# Patient Record
Sex: FEMALE | Race: WHITE | NOT HISPANIC OR LATINO | URBAN - METROPOLITAN AREA
[De-identification: names, ages, dates, MRNs, and addresses within clinical notes are randomized per-mention and may not be internally consistent; named-entity substitution may affect disease eponyms.]

---

## 2023-05-17 ENCOUNTER — INPATIENT (INPATIENT)
Facility: HOSPITAL | Age: 83
LOS: 4 days | Discharge: HOME | DRG: 603 | End: 2023-05-22
Attending: SURGERY | Admitting: SURGERY
Payer: MEDICARE

## 2023-05-17 VITALS
RESPIRATION RATE: 18 BRPM | TEMPERATURE: 99 F | HEART RATE: 61 BPM | OXYGEN SATURATION: 97 % | HEIGHT: 64 IN | WEIGHT: 188.05 LBS | SYSTOLIC BLOOD PRESSURE: 159 MMHG | DIASTOLIC BLOOD PRESSURE: 81 MMHG

## 2023-05-17 DIAGNOSIS — Z98.890 OTHER SPECIFIED POSTPROCEDURAL STATES: Chronic | ICD-10-CM

## 2023-05-17 LAB
ANION GAP SERPL CALC-SCNC: 10 MMOL/L — SIGNIFICANT CHANGE UP (ref 5–17)
APTT BLD: 35.2 SEC — SIGNIFICANT CHANGE UP (ref 27.5–35.5)
BLD GP AB SCN SERPL QL: NEGATIVE — SIGNIFICANT CHANGE UP
BUN SERPL-MCNC: 17 MG/DL — SIGNIFICANT CHANGE UP (ref 7–23)
CALCIUM SERPL-MCNC: 9.4 MG/DL — SIGNIFICANT CHANGE UP (ref 8.4–10.5)
CHLORIDE SERPL-SCNC: 104 MMOL/L — SIGNIFICANT CHANGE UP (ref 96–108)
CO2 SERPL-SCNC: 24 MMOL/L — SIGNIFICANT CHANGE UP (ref 22–31)
CREAT SERPL-MCNC: 0.88 MG/DL — SIGNIFICANT CHANGE UP (ref 0.5–1.3)
CRP SERPL-MCNC: <3 MG/L — SIGNIFICANT CHANGE UP (ref 0–4)
EGFR: 66 ML/MIN/1.73M2 — SIGNIFICANT CHANGE UP
ERYTHROCYTE [SEDIMENTATION RATE] IN BLOOD: 4 MM/HR — SIGNIFICANT CHANGE UP
GLUCOSE BLDC GLUCOMTR-MCNC: 295 MG/DL — HIGH (ref 70–99)
GLUCOSE SERPL-MCNC: 98 MG/DL — SIGNIFICANT CHANGE UP (ref 70–99)
HCT VFR BLD CALC: 43.4 % — SIGNIFICANT CHANGE UP (ref 34.5–45)
HGB BLD-MCNC: 14.3 G/DL — SIGNIFICANT CHANGE UP (ref 11.5–15.5)
INR BLD: 1.01 — SIGNIFICANT CHANGE UP (ref 0.88–1.16)
MCHC RBC-ENTMCNC: 31.2 PG — SIGNIFICANT CHANGE UP (ref 27–34)
MCHC RBC-ENTMCNC: 32.9 GM/DL — SIGNIFICANT CHANGE UP (ref 32–36)
MCV RBC AUTO: 94.6 FL — SIGNIFICANT CHANGE UP (ref 80–100)
NRBC # BLD: 0 /100 WBCS — SIGNIFICANT CHANGE UP (ref 0–0)
PLATELET # BLD AUTO: 296 K/UL — SIGNIFICANT CHANGE UP (ref 150–400)
POTASSIUM SERPL-MCNC: 4.1 MMOL/L — SIGNIFICANT CHANGE UP (ref 3.5–5.3)
POTASSIUM SERPL-SCNC: 4.1 MMOL/L — SIGNIFICANT CHANGE UP (ref 3.5–5.3)
PROTHROM AB SERPL-ACNC: 12 SEC — SIGNIFICANT CHANGE UP (ref 10.5–13.4)
RBC # BLD: 4.59 M/UL — SIGNIFICANT CHANGE UP (ref 3.8–5.2)
RBC # FLD: 13.9 % — SIGNIFICANT CHANGE UP (ref 10.3–14.5)
RH IG SCN BLD-IMP: POSITIVE — SIGNIFICANT CHANGE UP
SODIUM SERPL-SCNC: 138 MMOL/L — SIGNIFICANT CHANGE UP (ref 135–145)
WBC # BLD: 7.63 K/UL — SIGNIFICANT CHANGE UP (ref 3.8–10.5)
WBC # FLD AUTO: 7.63 K/UL — SIGNIFICANT CHANGE UP (ref 3.8–10.5)

## 2023-05-17 PROCEDURE — 99285 EMERGENCY DEPT VISIT HI MDM: CPT

## 2023-05-17 PROCEDURE — 73630 X-RAY EXAM OF FOOT: CPT | Mod: 26,RT

## 2023-05-17 RX ORDER — METOPROLOL TARTRATE 50 MG
25 TABLET ORAL
Refills: 0 | Status: DISCONTINUED | OUTPATIENT
Start: 2023-05-17 | End: 2023-05-22

## 2023-05-17 RX ORDER — HEPARIN SODIUM 5000 [USP'U]/ML
5000 INJECTION INTRAVENOUS; SUBCUTANEOUS EVERY 8 HOURS
Refills: 0 | Status: DISCONTINUED | OUTPATIENT
Start: 2023-05-17 | End: 2023-05-22

## 2023-05-17 RX ORDER — VANCOMYCIN HCL 1 G
1000 VIAL (EA) INTRAVENOUS ONCE
Refills: 0 | Status: COMPLETED | OUTPATIENT
Start: 2023-05-17 | End: 2023-05-17

## 2023-05-17 RX ADMIN — HEPARIN SODIUM 5000 UNIT(S): 5000 INJECTION INTRAVENOUS; SUBCUTANEOUS at 22:32

## 2023-05-17 RX ADMIN — Medication 250 MILLIGRAM(S): at 22:32

## 2023-05-17 RX ADMIN — Medication 25 MILLIGRAM(S): at 22:32

## 2023-05-17 NOTE — ED PROVIDER NOTE - WET READ LAUNCH FT
-- DO NOT REPLY / DO NOT REPLY ALL --  -- Message is from Engagement Center Operations (ECO) --    ONLY TO BE USED WITHIN A REFILL MEDICATION ENCOUNTER    Med Refill  Is the patient currently having any symptoms?: No/Non-Emergent symptoms    Name of medication requested: See pended med    Has patient contacted the pharmacy? Yes    Is this the first request for the medication in the last 48 hours?: Yes      Patient is requesting a medication refill - medication is on active list ([place] so she's requesting a 90 supply or the longest that can be approved      Full name of the provider who ordered the medication: [facility] site name / Account # for provider:Diaz    Preferred Pharmacy: Pharmacy  [id number] [address] [facility] [facility]    Patient confirmed the above pharmacy as correct?  Yes      Caller Information       Type Contact Phone/Fax    10/28/2022 11:18 AM CDT Phone (Incoming) KYLIE SMITH (Emergency Contact) [phone]          Alternative phone number:     Can a detailed message be left?: Yes    Patient is completely out of medication: Verify if patient is currently experiencing symptoms. If patient is symptomatic, proceed with front end triage instead of medication refill. If patient is not symptomatic but is completely out of medication, cole as High priority when routing. Inform patient: “Please call back with any questions or concerns and if your condition becomes life threatening, you should seek immediate medical assistance by calling 911 or going to the Emergency Department for evaluation.”    Inform all patients: \"If the clinical team needs to contact you regarding this refill, please be aware the return phone call may come from an unidentified or out of state phone number and your refill request will be addressed as soon as the clinical team reviews your message.\"  
Medication name: SITagliptin Phosphate 100 MG Oral Tablet (Januvia)  Last Refill Details: Date 9/8/2020, # of tablets: 90, # of refills approved:1   Ordering provider name: Yrn Lundy MD  Last office visit with ordering provider: 10/5/2022    unable to refill medication per established guidelines request forwarded to provider to review, please call patient to discuss refills    Patient is requesting a medication refill - medication is on active list (Patient will be out of the country for 6 months so she's requesting a 90 supply or the longest that can be approved      They are also requesting a call back with update once approved  KYLIE SMITH (Emergency Contact) [phone]         Preferred Pharmacy: Pharmacy  [id number] - [facility] [address] [facility]     
Refill approved as requested.  
There are no Wet Read(s) to document.

## 2023-05-17 NOTE — H&P ADULT - ASSESSMENT
81 yo f presented with concern of right foot infection after a fall that resulted in an injury to her right foot. Failed PO antibiotics     Plan:  - Admit to Dr. Gabriel   - q24 vanc  - pending x-ray read  - blood cultures drawn, pending results  - Home meds  - Discussed with Chief on call    [id number]

## 2023-05-17 NOTE — ED PROVIDER NOTE - PHYSICAL EXAMINATION
CONSTITUTIONAL: Non-toxic; in no apparent distress  HEAD: Normocephalic; atraumatic  EYES: No icterus or injection  ENMT: External appears normal  NECK: Supple; normal ROM, no visible mass, symmetric, trachea midline  CARD: Extremities warm and well perfused, normal HR, normal BP  RESP: No resp distress, symmetric chest rise  ABD: No obvious ascites or abd distension  EXT: Grossly normal ROM in all four extremities, + shallow ulcer on R MTP w surrounding erythema and mild swelling, 2+ BL PT and DP pulses, cap refill <2s, wwp  SKIN: Warm, dry, no rash  NEURO: Gait normal, grossly symmetric face, strength intact throughout extremities.

## 2023-05-17 NOTE — ED PROVIDER NOTE - OBJECTIVE STATEMENT
81 yo F w PMH of bunions, recent ulceration of R 1st MTP since March, p/w redness and swelling surrounding ulcer. Pt had fall in March during which she wounded her foot, causing the initial ulceration. Since that time she has visited a podiatrist, completed a course of PO abx, and started using a topical debriding agent on the ulcer. There has been Increasing redness and pain in her R MTP, prompting her podiatrist to send her into the ED for concern for osteomyelitis. She has had recent chills, however no measured fever. No chest pain, shortness of breath, numbness, tingling.

## 2023-05-17 NOTE — H&P ADULT - NSHPLABSRESULTS_GEN_ALL_CORE
14.3   7.63  )-----------( 296      ( 17 May 2023 20:51 )             43.4   05-17    138  |  104  |  17  ----------------------------<  98  4.1   |  24  |  0.88    Ca    9.4      17 May 2023 20:51      pending radiology read of xray

## 2023-05-17 NOTE — ED ADULT NURSE NOTE - OBJECTIVE STATEMENT
82y F, PMHx HTN, asthma and ADHD, presents to the ED c/o rt foot pain, swelling since a fall in March 2023. Pt states she was sent in by MD for R foot to r/o infection. Minimal drainage noted to rt foot. Denies chest pain, fever, numbness, tingling.

## 2023-05-17 NOTE — ED ADULT NURSE NOTE - NSFALLRISKINTERV_ED_ALL_ED

## 2023-05-17 NOTE — ED PROVIDER NOTE - NS ED ROS FT
CONSTITUTIONAL: No fever, no chills, no fatigue  EYES: No eye redness, no visual changes  ENT: No ear pain, no sore throat  CARDIOVASCULAR: No chest pain, no palpitations  RESPIRATORY: No cough, no SOB  GI: No abdominal pain, no nausea, no vomiting, no constipation, no diarrhea  GENITOURINARY: No dysuria, no frequency, no hematuria  MUSCULOSKELETAL: No back pain, no joint pain, no myalgias  SKIN: No rash, no peripheral edema, + foot ulcer  NEURO: No headache, no confusion    ALL OTHER SYSTEMS NEGATIVE.

## 2023-05-17 NOTE — ED PROVIDER NOTE - CLINICAL SUMMARY MEDICAL DECISION MAKING FREE TEXT BOX
R foot MTP ulceration, r/o OM. No s/s of systemic infection. Will consult pod and vasc.  + BCx, +/- Abx. Basic labs, XR foot.  For admission.

## 2023-05-17 NOTE — CONSULT NOTE ADULT - SUBJECTIVE AND OBJECTIVE BOX
Attending: Dr. Laws     Patient is a 82y old  Female who presents with a chief complaint of Ulcerated Bunion (17 May 2023 21:21)      HPI:  Ms. Patel is a 81 yo F, reported pmh HTN, asthma and ADHD, presenting with concern for right foot infection. States that she has had bilateral bunions for the past several years but she sustained a fall on March 11th where she admits to LOC and injury to her right foot at the tip of the bunion. Since then it has been red and inflamed and painful, she reports that she can ambulate with a cane but it is painful and it is painful to drive. She reports that she had tried topical neosporin that only made the wound worse and then was seen by her podiatrist who prescribed her an oral antibiotic that she cannot recall for presumed treatment of cellulitis. She comes in today to see Dr. Gabriel on the recommendation of her niece who has had correspondence with Dr. Gabriel. Of note, [place], sees Dr. Elaine for podaitry. She denies any vascular history although reports that her right foot has always felt colder than her left.  Podiatry Addendum: 81 yo F, reported pmh HTN, asthma and ADHD, presenting with concern for right foot infection. Pod consulted to evaluate foot. Pt states she fell 1 month ago and developed wounds to b/l feet. States wounds had not been present prior. Developed a pain and a shooting type pain to R foot. Has seen podiatrist Dr. Elaine in [place] for treatment. Recieved oral antibiotics for presumed cellulitis, but did not resolve infection. Wounds also failing to heal. States she generally does have slow healing wounds. Complains of coldness to her feet. Denying purulent discharge/malodor.     Review of systems negative except per HPI and as stated below  General:	 no weakness; no fevers, no chills  Skin/Breast: no rash  Respiratory and Thorax: no SOB, no cough  Cardiovascular:	No chest pain  Gastrointestinal:	 no nausea, vomiting , diarrhea  Genitourinary:	no dysuria, no difficulty urinating, no hematuria  Musculoskeletal:	no weakness, no joint swelling/pain  Neurological:	no focal weakness/numbness  Endocrine:	no polyuria, no polydipsia    PAST MEDICAL & SURGICAL HISTORY:  HTN (hypertension)      Asthma      ADHD      History of conization of cervix        Home Medications:  metoprolol tartrate 25 mg oral tablet: 1 orally 2 times a day (17 May 2023 21:26)    Allergies    No Known Drug Allergies  unknown pain medication allergy (Other)    Intolerances      FAMILY HISTORY:    Social History:       LABS                        14.3   7.63  )-----------( 296      ( 17 May 2023 20:51 )             43.4     05-17    138  |  104  |  17  ----------------------------<  98  4.1   |  24  |  0.88    Ca    9.4      17 May 2023 20:51      PT/INR - ( 17 May 2023 20:51 )   PT: 12.0 sec;   INR: 1.01          PTT - ( 17 May 2023 20:51 )  PTT:35.2 sec  ESR: 4  CRP: --  05-17 @ 20:51    Vital Signs Last 24 Hrs  T(C): 36.1 (17 May 2023 22:11), Max: 37.2 (17 May 2023 19:45)  T(F): 97 (17 May 2023 22:11), Max: 99 (17 May 2023 19:45)  HR: 61 (17 May 2023 19:45) (61 - 61)  BP: 159/81 (17 May 2023 19:45) (159/81 - 159/81)  BP(mean): --  RR: 18 (17 May 2023 19:45) (18 - 18)  SpO2: 97% (17 May 2023 19:45) (97% - 97%)    Parameters below as of 17 May 2023 19:45  Patient On (Oxygen Delivery Method): room air        PHYSICAL EXAM  General: NAD, AA0x3    Lower Extremity Focused:  Vasc: DP/PT 1/4 b/l, Erythema of R foot extending to midfoot, mild erythema on L medial eminence 1st MPJ, no edema, Temp gradient cool to cold b/l  Derm: Superficial granular wound on R medial MPJ 1 cm x 0.6 cm 0.1 cm with no drainage/tracking or tunneling; hyperkeratotic lesion on R dorsal and medial 3rd digit DIPJ; hyperkeratotic lesion dorsal 2nd DIPJ; superficial wound on lateral 5th met tuberosity with granular base; hyperkeratotic border and surrounding erythema; scab over wound on L lateral malleolus with no drainage; no other open wounds;  Neuro: Protective sensation diminished  MSK: B/l bunion deformity,     RADIOLOGY  XR R foot wet read: no evidence of gas, bunion deformity

## 2023-05-17 NOTE — H&P ADULT - NSHPREVIEWOFSYSTEMS_GEN_ALL_CORE
Head: denies headaches, dizziness & lightheadedness  Eyes: denies changes in vision, eye pain, double vision & eye discharge  Ears: denies changes in hearing & ear discharge  Nose: denies rhinorrhea  Mouth: denies bleeding gums & sore tongue & sore throat  Neck: denies swollen lymph nodes   Respiratory: denies SOB, cough, sputum production, wheezing  Cardiac: denies CP & irregular heart beat  Abdominal: denies abdominal pain, + in bowel movements  : denies dysuria, frequent urination, hematuria  Musculoskeletal: b/l foot pain, r foot pain with ambulation and driving   Neuro: denies involuntary muscle movements  Psych: no depression, no anxiety

## 2023-05-17 NOTE — CONSULT NOTE ADULT - SUBJECTIVE AND OBJECTIVE BOX
Vascular Attending: NIYAH Gabriel MD    HPI:  Ms. Patel is a 81 yo F, reported pmh HTN, asthma and ADHD, presenting with concern for right foot infection. States that she has had bilateral bunions for the past several years but she sustained a fall on March 11th where she admits to LOC and injury to her right foot at the tip of the bunion. Since then it has been red and inflamed and painful, she reports that she can ambulate with a cane but it is painful and it is painful to drive. She reports that she had tried topical neosporin that only made the wound worse and then was seen by her podiatrist who prescribed her an oral antibiotic that she cannot recall for presumed treatment of cellulitis. She comes in today to see Dr. Gabriel on the recommendation of her niece who has had correspondence with Dr. Gabriel. Of note, the patient lives in [facility], sees Dr. Elaine for podaitry. She denies any vascular history although reports that her right foot has always felt colder than her left.    PAST MEDICAL & SURGICAL HISTORY:  HTN    ADHD    Asthma    H/o conization    MEDICATIONS  (STANDING):  metoprolol 25 BID    Drug Allergies Not Recorded  unknown pain medication allergy (Other)    Vital Signs Last 24 Hrs  T(C): 37.2 (17 May 2023 19:45), Max: 37.2 (17 May 2023 19:45)  T(F): 99 (17 May 2023 19:45), Max: 99 (17 May 2023 19:45)  HR: 61 (17 May 2023 19:45) (61 - 61)  BP: 159/81 (17 May 2023 19:45) (159/81 - 159/81)  BP(mean): --  RR: 18 (17 May 2023 19:45) (18 - 18)  SpO2: 97% (17 May 2023 19:45) (97% - 97%)    Parameters below as of 17 May 2023 19:45  Patient On (Oxygen Delivery Method): room air    PHYSICAL EXAM:  Constitutional: AAOx3, no acute distress  HEENT: NCAT, airway patent  Cardiovascular: RRR, pulses present bilaterally  Respiratory: nonlabored breathing  Gastrointestinal: abdomen soft, nontender, non distended  Neuro: no focal deficits  Extremities: palpable DP/PT pulses b/l, b/l bunion, right sided bunion erythematous and warm localized to area of bunion, ulcerated with small amount of pus on dressing, Left foot slightly edematous, b/l motor and sensory function in tact     LABS:  [pending]

## 2023-05-17 NOTE — H&P ADULT - HISTORY OF PRESENT ILLNESS
Ms. Patel is a 81 yo F, reported pmh HTN, asthma and ADHD, presenting with concern for right foot infection. States that she has had bilateral bunions for the past several years but she sustained a fall on March 11th where she admits to LOC and injury to her right foot at the tip of the bunion. Since then it has been red and inflamed and painful, she reports that she can ambulate with a cane but it is painful and it is painful to drive. She reports that she had tried topical neosporin that only made the wound worse and then was seen by her podiatrist who prescribed her an oral antibiotic that she cannot recall for presumed treatment of cellulitis. She comes in today to see Dr. Gabriel on the recommendation of her niece who has had correspondence with Dr. Gabriel. Of note, [place], sees Dr. Elaine for podaitry. She denies any vascular history although reports that her right foot has always felt colder than her left.

## 2023-05-17 NOTE — H&P ADULT - NSHPPHYSICALEXAM_GEN_ALL_CORE
Constitutional: AAOx3, no acute distress  HEENT: NCAT, airway patent  Cardiovascular: RRR, pulses present bilaterally  Respiratory: nonlabored breathing  Gastrointestinal: abdomen soft, nontender, non distended  Neuro: no focal deficits  Extremities: palpable DP/PT pulses b/l, b/l bunion, right sided bunion erythematous and warm localized to area of bunion, ulcerated with scant pus on dressing, Left foot slightly edematous, b/l motor and sensory function in tact

## 2023-05-17 NOTE — ED ADULT NURSE NOTE - NS ED NURSE LEVEL OF CONSCIOUSNESS AFFECT
Action Requested: Summary for Provider     []  Critical Lab, Recommendations Needed  [x] Need Additional Advice  []   FYI    []   Need Orders  [] Need Medications Sent to Pharmacy  []  Other     SUMMARY: decreased energy level; interacting appropriately with parents, no current signs of illness observed     Reason for call: Acute (decreased energy)  Onset: observed today     Mom connected to triage  Patient with decreased energy, observed this morning   Mom notes, Gio Herrera is usually very smiley in the mornings, today she isn't smiling as much\"     Patient not as active this morning   At time of call, patient is sitting in high chair, \"she looks tired' -per mom   Patient will respond to parent. No confused behavior observed     No fever   No nasal congestion   No cough    No rash  No ear tugging   Appetite fluctuates, per mom (this is not a new observation)   Sleeping well     An appointment was scheduled for tomorrow morning, 6/24 at [facility] with Dr Evan Damian for an evaluation of symptoms. Mom was advised to monitor. Continue with daily established routine. If overall symptoms perceived to be worsening/patient presents with confused behavior or difficult to arouse from sleep/napping, mom was advised that patient should be taken to nearest ER promptly for evaluation and treatment. If mom with increasing concerns, can take child to nearest urgent care for overall evaluation. Mom aware. Mom encourage to call peds back sooner if with further cocnerns and/or questions. udnerstnading verbalized. Please advise- agree with current triage/protocol review?  Additional recommendations?      (well-exam with provider 6/12/21)   Reason for Disposition  Betty Rojas thinks child needs to be seen for non-urgent acute problem    Protocols used: WEAKNESS (GENERALIZED) AND FATIGUE-P-OH Calm/Appropriate

## 2023-05-18 LAB
ANION GAP SERPL CALC-SCNC: 6 MMOL/L — SIGNIFICANT CHANGE UP (ref 5–17)
BLD GP AB SCN SERPL QL: NEGATIVE — SIGNIFICANT CHANGE UP
BUN SERPL-MCNC: 20 MG/DL — SIGNIFICANT CHANGE UP (ref 7–23)
CALCIUM SERPL-MCNC: 8.9 MG/DL — SIGNIFICANT CHANGE UP (ref 8.4–10.5)
CHLORIDE SERPL-SCNC: 108 MMOL/L — SIGNIFICANT CHANGE UP (ref 96–108)
CO2 SERPL-SCNC: 24 MMOL/L — SIGNIFICANT CHANGE UP (ref 22–31)
CREAT SERPL-MCNC: 0.93 MG/DL — SIGNIFICANT CHANGE UP (ref 0.5–1.3)
EGFR: 62 ML/MIN/1.73M2 — SIGNIFICANT CHANGE UP
GLUCOSE SERPL-MCNC: 87 MG/DL — SIGNIFICANT CHANGE UP (ref 70–99)
HCT VFR BLD CALC: 39.5 % — SIGNIFICANT CHANGE UP (ref 34.5–45)
HGB BLD-MCNC: 12.8 G/DL — SIGNIFICANT CHANGE UP (ref 11.5–15.5)
MAGNESIUM SERPL-MCNC: 2.1 MG/DL — SIGNIFICANT CHANGE UP (ref 1.6–2.6)
MCHC RBC-ENTMCNC: 31.1 PG — SIGNIFICANT CHANGE UP (ref 27–34)
MCHC RBC-ENTMCNC: 32.4 GM/DL — SIGNIFICANT CHANGE UP (ref 32–36)
MCV RBC AUTO: 96.1 FL — SIGNIFICANT CHANGE UP (ref 80–100)
NRBC # BLD: 0 /100 WBCS — SIGNIFICANT CHANGE UP (ref 0–0)
PHOSPHATE SERPL-MCNC: 3.6 MG/DL — SIGNIFICANT CHANGE UP (ref 2.5–4.5)
PLATELET # BLD AUTO: 247 K/UL — SIGNIFICANT CHANGE UP (ref 150–400)
POTASSIUM SERPL-MCNC: 4.2 MMOL/L — SIGNIFICANT CHANGE UP (ref 3.5–5.3)
POTASSIUM SERPL-SCNC: 4.2 MMOL/L — SIGNIFICANT CHANGE UP (ref 3.5–5.3)
RBC # BLD: 4.11 M/UL — SIGNIFICANT CHANGE UP (ref 3.8–5.2)
RBC # FLD: 14.2 % — SIGNIFICANT CHANGE UP (ref 10.3–14.5)
RH IG SCN BLD-IMP: POSITIVE — SIGNIFICANT CHANGE UP
SODIUM SERPL-SCNC: 138 MMOL/L — SIGNIFICANT CHANGE UP (ref 135–145)
VANCOMYCIN TROUGH SERPL-MCNC: 5.1 UG/ML — LOW (ref 10–20)
WBC # BLD: 7.17 K/UL — SIGNIFICANT CHANGE UP (ref 3.8–10.5)
WBC # FLD AUTO: 7.17 K/UL — SIGNIFICANT CHANGE UP (ref 3.8–10.5)

## 2023-05-18 PROCEDURE — 73718 MRI LOWER EXTREMITY W/O DYE: CPT | Mod: 26,RT

## 2023-05-18 PROCEDURE — 99232 SBSQ HOSP IP/OBS MODERATE 35: CPT

## 2023-05-18 PROCEDURE — 99222 1ST HOSP IP/OBS MODERATE 55: CPT

## 2023-05-18 RX ORDER — VANCOMYCIN HCL 1 G
1250 VIAL (EA) INTRAVENOUS ONCE
Refills: 0 | Status: COMPLETED | OUTPATIENT
Start: 2023-05-18 | End: 2023-05-19

## 2023-05-18 RX ORDER — ALBUTEROL 90 UG/1
2 AEROSOL, METERED ORAL EVERY 6 HOURS
Refills: 0 | Status: DISCONTINUED | OUTPATIENT
Start: 2023-05-18 | End: 2023-05-22

## 2023-05-18 RX ADMIN — Medication 375 MILLIGRAM(S): at 21:40

## 2023-05-18 RX ADMIN — HEPARIN SODIUM 5000 UNIT(S): 5000 INJECTION INTRAVENOUS; SUBCUTANEOUS at 21:40

## 2023-05-18 RX ADMIN — Medication 375 MILLIGRAM(S): at 00:51

## 2023-05-18 RX ADMIN — Medication 25 MILLIGRAM(S): at 05:59

## 2023-05-18 RX ADMIN — Medication 25 MILLIGRAM(S): at 18:02

## 2023-05-18 RX ADMIN — HEPARIN SODIUM 5000 UNIT(S): 5000 INJECTION INTRAVENOUS; SUBCUTANEOUS at 05:59

## 2023-05-18 RX ADMIN — Medication 375 MILLIGRAM(S): at 22:40

## 2023-05-18 RX ADMIN — Medication 375 MILLIGRAM(S): at 01:51

## 2023-05-18 RX ADMIN — HEPARIN SODIUM 5000 UNIT(S): 5000 INJECTION INTRAVENOUS; SUBCUTANEOUS at 14:49

## 2023-05-18 NOTE — PATIENT PROFILE ADULT - FALL HARM RISK - HARM RISK INTERVENTIONS
Assistance with ambulation/Assistance OOB with selected safe patient handling equipment/Communicate Risk of Fall with Harm to all staff/Discuss with provider need for PT consult/Monitor gait and stability/Reinforce activity limits and safety measures with patient and family/Review medications for side effects contributing to fall risk/Sit up slowly, dangle for a short time, stand at bedside before walking/Tailored Fall Risk Interventions/Use of alarms - bed, chair and/or voice tab/Visual Cue: Yellow wristband and red socks/Bed in lowest position, wheels locked, appropriate side rails in place/Call bell, personal items and telephone in reach/Instruct patient to call for assistance before getting out of bed or chair/Non-slip footwear when patient is out of bed/[place] to call system/Physically safe environment - no spills, clutter or unnecessary equipment/Purposeful Proactive Rounding/Room/bathroom lighting operational, light cord in reach

## 2023-05-18 NOTE — CONSULT NOTE ADULT - ASSESSMENT
83 yo f presented with concern of right foot infection after a fall that resulted in an injury to her right foot. Vascular consulted for ulcerated bunion.     Plan:  - pending labs and blood cultures  - pending imaging   - plan pending  - Discussed with Chief on call     [id number]
81 yo f presented with concern of right foot infection after a fall that resulted in medial R foot wound with surrounding erythema for 1 month that po antibiotics did not resolve. Follows podiatrist in [place] a At time of consult, WBC 7.63, ESR 4, CRP <3. Low suspicion for acute infection of R foot as wound is superficial in nature with granular base and no drainage. Likely chronic non-healing wound.     Plan:   - Admit to vascular   - C/w antibiotics per primary team   - Wound care by primary team   - Rest of care per primary team         Podiatry will sign off. Please re-consult with Questions. 
82-year-old female with a PMHx of HTN and asthma who presented with concern for right foot infection after failed PO antibiotics.       #Concern for Right Foot Infection   -further management as per vascular surgery    -podiatry consulted, appreciate recommendations    -xray without evidence of OM, MRI pending although low clinical suspicion for OM  -BCx (5/17): pending   -continue with vancomycin for now      #HTN   -continue with metoprolol tartrate 25mg BID     #Asthma  -continue with PRN Albuterol     DVT PPx: SQH    Dispo: pending MRI

## 2023-05-18 NOTE — PROGRESS NOTE ADULT - SUBJECTIVE AND OBJECTIVE BOX
O/N: admit to [facility]. iv vanc, dressing wrapped.                       ---------------------------------------------------------------------------  PLEASE CHECK WHEN PRESENT:  [  ] Heart Failure  [  ] Acute  [  ] Acute on Chronic  [  ] Chronic  -------------------------------------------------------------------  [  ]Diastolic [HFpEF]  [  ]Systolic [HFrEF]  [  ]Combined [HFpEF & HFrEF]  [  ] afib  [  ] hypertensive heart disease  [  ]Other:  -------------------------------------------------------------------  [ ] Respiratory failure  [ ] Acute cor pulmonale  [ ] Asthma/COPD Exacerbation  [ ] Pleural effusion  [ ] Aspiration pneumonia  -------------------------------------------------------------------  [  ]JAJA  [  ]ATN  [  ]Reneal Medullary Necrosis  [  ]Renal Cortical Necrosis  [  ]Other Pathological Lesions:    [  ]CKD 1  [  ]CKD 2  [  ]CKD 3  [  ]CKD 4  [  ]CKD 5  [  ]Other  -------------------------------------------------------------------  [  ]Diabetes  [  ] Diabetic PVD Ulcer  [  ] Neuropathic ulcer to DM  [  ] Diabetes with Nephropathy  [  ] Osteomyelitis due to diabetes  --------------------------------------------------------------------  [  ]Malnutrition: See Nutrition Note  [  ]Cachexia  [  ]Other:   [  ]Supplement Ordered:  [  ]Morbid Obesity (BMI >=40]  ---------------------------------------------------------------------  [ ] Sepsis/severe sepsis/septic shock  [ ] UTI  [ ] Pneumonia  -----------------------------------------------------------------------  [ ] Acidosis/alkalosis  [ ] Fluid overload  [ ] Hypokalemia  [ ] Hyperkalemia  [ ] Hypomagnesemia  [ ] Hypophosphatemia  [ ] Hyperphosphatemia  ------------------------------------------------------------------------  [ ] Acute blood loss anemia  [ ] Post op blood loss anemia  [ ] Iron deficiency anemia  [ ] Anemia due to chronic disease  [ ] Hypercoagulable state  ----------------------------------------------------------------------  [ ] Cerebral infarction  [ ] Transient ischemia attack  [ ] Encephalopathy      Assessment:  · Assessment	  81 yo f presented with concern of right foot infection after a fall that resulted in an injury to her right foot. Failed PO antibiotics     Plan:  - Admit to Dr. Gabriel   - q24 vanc  - pending x-ray read  - blood cultures drawn, pending results  - Home meds  - Discussed with Chief on call    [phone]   O/N: admit to [facility]. iv vanc, dressing wrapped.       Subjective: Patient seen and examined at the bedside. No new complaints.     ROS:   Denies Headache, blurred vision, Chest Pain, SOB, Abdominal pain, nausea or vomiting     Social   heparin   Injectable 5000  metoprolol tartrate 25      Allergies    No Known Drug Allergies  unknown pain medication allergy (Other)    Intolerances        Vital Signs Last 24 Hrs  T(C): 36.2 (18 May 2023 05:30), Max: 37.2 (17 May 2023 19:45)  T(F): 97.1 (18 May 2023 05:30), Max: 99 (17 May 2023 19:45)  HR: 76 (18 May 2023 00:04) (61 - 78)  BP: 133/62 (18 May 2023 00:04) (133/62 - 159/81)  BP(mean): --  RR: 18 (18 May 2023 00:04) (18 - 18)  SpO2: 94% (18 May 2023 00:04) (94% - 97%)    Parameters below as of 18 May 2023 00:04  Patient On (Oxygen Delivery Method): room air      I&O's Summary    17 May 2023 07:01  -  18 May 2023 06:51  --------------------------------------------------------  IN: 0 mL / OUT: 400 mL / NET: -400 mL      Physical Exam:   General: Supine on stretcher, no acute distress  Neuro: Alert and oriented. Sensation and motor function intact.   Pulm: Normal work of breathing.   Cardiovascular: RRR  Vascular: Palpable pedal pulses. Triphasic DP and PT.   Gastrointestinal: abdomen soft, nontender, non distended  Neuro: no focal deficits  Extremities: right sided bunion erythematous and warm localized to area of bunion, ulcerated with scant pus on dressing, Left foot slightly edematous, b/l motor and sensory function in tact      LABS:                        14.3   7.63  )-----------( 296      ( 17 May 2023 20:51 )             43.4     05-17    138  |  104  |  17  ----------------------------<  98  4.1   |  24  |  0.88    Ca    9.4      17 May 2023 20:51      PT/INR - ( 17 May 2023 20:51 )   PT: 12.0 sec;   INR: 1.01          PTT - ( 17 May 2023 20:51 )  PTT:35.2 sec      ---------------------------------------------------------------------------  PLEASE CHECK WHEN PRESENT:  [  ] Heart Failure  [  ] Acute  [  ] Acute on Chronic  [  ] Chronic  -------------------------------------------------------------------  [  ]Diastolic [HFpEF]  [  ]Systolic [HFrEF]  [  ]Combined [HFpEF & HFrEF]  [  ] afib  [  ] hypertensive heart disease  [  ]Other:  -------------------------------------------------------------------  [ ] Respiratory failure  [ ] Acute cor pulmonale  [ ] Asthma/COPD Exacerbation  [ ] Pleural effusion  [ ] Aspiration pneumonia  -------------------------------------------------------------------  [  ]JAJA  [  ]ATN  [  ]Reneal Medullary Necrosis  [  ]Renal Cortical Necrosis  [  ]Other Pathological Lesions:    [  ]CKD 1  [  ]CKD 2  [  ]CKD 3  [  ]CKD 4  [  ]CKD 5  [  ]Other  -------------------------------------------------------------------  [  ]Diabetes  [  ] Diabetic PVD Ulcer  [  ] Neuropathic ulcer to DM  [  ] Diabetes with Nephropathy  [  ] Osteomyelitis due to diabetes  --------------------------------------------------------------------  [  ]Malnutrition: See Nutrition Note  [  ]Cachexia  [  ]Other:   [  ]Supplement Ordered:  [  ]Morbid Obesity (BMI >=40]  ---------------------------------------------------------------------  [ ] Sepsis/severe sepsis/septic shock  [ ] UTI  [ ] Pneumonia  -----------------------------------------------------------------------  [ ] Acidosis/alkalosis  [ ] Fluid overload  [ ] Hypokalemia  [ ] Hyperkalemia  [ ] Hypomagnesemia  [ ] Hypophosphatemia  [ ] Hyperphosphatemia  ------------------------------------------------------------------------  [ ] Acute blood loss anemia  [ ] Post op blood loss anemia  [ ] Iron deficiency anemia  [ ] Anemia due to chronic disease  [ ] Hypercoagulable state  ----------------------------------------------------------------------  [ ] Cerebral infarction  [ ] Transient ischemia attack  [ ] Encephalopathy      Assessment:  · Assessment	  81 yo f presented with hx of HTN and asthma p/w concern of right foot infection after a fall that resulted in an injury to her right foot. Failed PO antibiotics     Plan:  Vascular/infx foot wound  - Admit to Dr. Gabriel   - [facility] by level  - pending x-ray read  -pain control      HTN:   -continue home metoprolol       ID:   - blood cultures drawn, pending results    DVTppx: hsq    Activity: as tolerated    Diet: dash    Dispo: continue IV abx

## 2023-05-18 NOTE — CONSULT NOTE ADULT - SUBJECTIVE AND OBJECTIVE BOX
82-year-old female with a PMHx of HTN and asthma who presented with concern for right foot infection after failed PO antibiotics.  Patient is admitted to vascular surgery with medicine to follow as co-management.      Today, patient is overall doing well.  States she has burning pain of her right foot but otherwise has no complaints.  Denies HA, CP, SOB, abdominal pain, nausea, vomiting, fever, chills or diarrhea.        PMHx: as per HPI     PSHx: conization of cervix       FHx: non-contributory      SHx: NKDA     Home Medications:    -metoprolol tartrate 25mg BID   -albuterol PRN     Objective:  Vital Signs:  T(C): 36.2 (18 May 2023 05:30), Max: 37.2 (17 May 2023 19:45)  T(F): 97.1 (18 May 2023 05:30), Max: 99 (17 May 2023 19:45)  HR: 76 (18 May 2023 05:55) (61 - 78)  BP: 162/72 (18 May 2023 05:55) (133/62 - 162/72)  BP(mean): --  RR: 18 (18 May 2023 05:55) (18 - 18)  SpO2: 95% (18 May 2023 05:55) (94% - 97%)    Parameters below as of 18 May 2023 05:55  Patient On (Oxygen Delivery Method): room air    Physical Exam:  -Gen: NAD, resting in bed  -HEENT: EOMI, PERRL, no scleral icterus, no JVD, no bruits  -CV: normal S1 and S2  -Lungs: CTABL, normal respiratory effort on RA  -Ab: soft, NT, ND, normal BS  -Ext: no LE edema, right foot wrapped   -Neuro: A&O x 3, no focal deficits     Labs:                        12.8   7.17  )-----------( 247      ( 18 May 2023 06:34 )             39.5   05-18    138  |  108  |  20  ----------------------------<  87  4.2   |  24  |  0.93    Ca    8.9      18 May 2023 06:34  Phos  3.6     05-18  Mg     2.1     05-18    Medications:  MEDICATIONS  (STANDING):  heparin   Injectable 5000 Unit(s) SubCutaneous every 8 hours  metoprolol tartrate 25 milliGRAM(s) Oral two times a day    MEDICATIONS  (PRN):  albuterol    90 MICROgram(s) HFA Inhaler 2 Puff(s) Inhalation every 6 hours PRN Shortness of Breath and/or Wheezing  naproxen 375 milliGRAM(s) Oral every 8 hours PRN Moderate Pain (4 - 6)       82-year-old female with a PMHx of HTN and asthma who presented with concern for right foot infection after failed PO antibiotics.  Patient is admitted to vascular surgery with medicine to follow as co-management.      Today, patient is overall doing well.  States she has burning pain of her right foot but otherwise has no complaints.  Denies HA, CP, SOB, abdominal pain, nausea, vomiting, fever, chills or diarrhea.        PMHx: as per HPI     PSHx: conization of cervix       FHx: non-contributory      SHx: former alcohol use (quit 2 months ago), denies tobacco or illicit drug use     Allergies: NKDA     Home Medications:    -metoprolol tartrate 25mg BID   -albuterol PRN     Objective:  Vital Signs:  T(C): 36.2 (18 May 2023 05:30), Max: 37.2 (17 May 2023 19:45)  T(F): 97.1 (18 May 2023 05:30), Max: 99 (17 May 2023 19:45)  HR: 76 (18 May 2023 05:55) (61 - 78)  BP: 162/72 (18 May 2023 05:55) (133/62 - 162/72)  BP(mean): --  RR: 18 (18 May 2023 05:55) (18 - 18)  SpO2: 95% (18 May 2023 05:55) (94% - 97%)    Parameters below as of 18 May 2023 05:55  Patient On (Oxygen Delivery Method): room air    Physical Exam:  -Gen: NAD, resting in bed  -HEENT: EOMI, PERRL, no scleral icterus, no JVD, no bruits  -CV: normal S1 and S2  -Lungs: CTABL, normal respiratory effort on RA  -Ab: soft, NT, ND, normal BS  -Ext: no LE edema, right foot wrapped   -Neuro: A&O x 3, no focal deficits     Labs:                        12.8   7.17  )-----------( 247      ( 18 May 2023 06:34 )             39.5   05-18    138  |  108  |  20  ----------------------------<  87  4.2   |  24  |  0.93    Ca    8.9      18 May 2023 06:34  Phos  3.6     05-18  Mg     2.1     05-18    Medications:  MEDICATIONS  (STANDING):  heparin   Injectable 5000 Unit(s) SubCutaneous every 8 hours  metoprolol tartrate 25 milliGRAM(s) Oral two times a day    MEDICATIONS  (PRN):  albuterol    90 MICROgram(s) HFA Inhaler 2 Puff(s) Inhalation every 6 hours PRN Shortness of Breath and/or Wheezing  naproxen 375 milliGRAM(s) Oral every 8 hours PRN Moderate Pain (4 - 6)

## 2023-05-19 LAB
ANION GAP SERPL CALC-SCNC: 11 MMOL/L — SIGNIFICANT CHANGE UP (ref 5–17)
BUN SERPL-MCNC: 20 MG/DL — SIGNIFICANT CHANGE UP (ref 7–23)
CALCIUM SERPL-MCNC: 8.4 MG/DL — SIGNIFICANT CHANGE UP (ref 8.4–10.5)
CHLORIDE SERPL-SCNC: 108 MMOL/L — SIGNIFICANT CHANGE UP (ref 96–108)
CO2 SERPL-SCNC: 20 MMOL/L — LOW (ref 22–31)
CREAT SERPL-MCNC: 0.96 MG/DL — SIGNIFICANT CHANGE UP (ref 0.5–1.3)
EGFR: 59 ML/MIN/1.73M2 — LOW
GLUCOSE SERPL-MCNC: 88 MG/DL — SIGNIFICANT CHANGE UP (ref 70–99)
HCT VFR BLD CALC: 38.3 % — SIGNIFICANT CHANGE UP (ref 34.5–45)
HGB BLD-MCNC: 12.5 G/DL — SIGNIFICANT CHANGE UP (ref 11.5–15.5)
MAGNESIUM SERPL-MCNC: 2.1 MG/DL — SIGNIFICANT CHANGE UP (ref 1.6–2.6)
MCHC RBC-ENTMCNC: 31.3 PG — SIGNIFICANT CHANGE UP (ref 27–34)
MCHC RBC-ENTMCNC: 32.6 GM/DL — SIGNIFICANT CHANGE UP (ref 32–36)
MCV RBC AUTO: 96 FL — SIGNIFICANT CHANGE UP (ref 80–100)
NRBC # BLD: 0 /100 WBCS — SIGNIFICANT CHANGE UP (ref 0–0)
PHOSPHATE SERPL-MCNC: 3 MG/DL — SIGNIFICANT CHANGE UP (ref 2.5–4.5)
PLATELET # BLD AUTO: 243 K/UL — SIGNIFICANT CHANGE UP (ref 150–400)
POTASSIUM SERPL-MCNC: 4 MMOL/L — SIGNIFICANT CHANGE UP (ref 3.5–5.3)
POTASSIUM SERPL-SCNC: 4 MMOL/L — SIGNIFICANT CHANGE UP (ref 3.5–5.3)
RBC # BLD: 3.99 M/UL — SIGNIFICANT CHANGE UP (ref 3.8–5.2)
RBC # FLD: 13.9 % — SIGNIFICANT CHANGE UP (ref 10.3–14.5)
SODIUM SERPL-SCNC: 139 MMOL/L — SIGNIFICANT CHANGE UP (ref 135–145)
WBC # BLD: 6.9 K/UL — SIGNIFICANT CHANGE UP (ref 3.8–10.5)
WBC # FLD AUTO: 6.9 K/UL — SIGNIFICANT CHANGE UP (ref 3.8–10.5)

## 2023-05-19 PROCEDURE — 73630 X-RAY EXAM OF FOOT: CPT | Mod: 26,LT

## 2023-05-19 PROCEDURE — 99232 SBSQ HOSP IP/OBS MODERATE 35: CPT

## 2023-05-19 RX ADMIN — HEPARIN SODIUM 5000 UNIT(S): 5000 INJECTION INTRAVENOUS; SUBCUTANEOUS at 21:33

## 2023-05-19 RX ADMIN — Medication 375 MILLIGRAM(S): at 06:54

## 2023-05-19 RX ADMIN — HEPARIN SODIUM 5000 UNIT(S): 5000 INJECTION INTRAVENOUS; SUBCUTANEOUS at 16:21

## 2023-05-19 RX ADMIN — Medication 25 MILLIGRAM(S): at 06:00

## 2023-05-19 RX ADMIN — Medication 166.67 MILLIGRAM(S): at 00:37

## 2023-05-19 RX ADMIN — HEPARIN SODIUM 5000 UNIT(S): 5000 INJECTION INTRAVENOUS; SUBCUTANEOUS at 06:00

## 2023-05-19 RX ADMIN — Medication 25 MILLIGRAM(S): at 16:21

## 2023-05-19 RX ADMIN — Medication 375 MILLIGRAM(S): at 07:54

## 2023-05-19 NOTE — SBIRT NOTE ADULT - NSSBIRTUNABLESCROTHER_GEN_A_CORE
Patient denies smoking. Patient reports she previously was drinking one drink every night. Patient reports she stopped drinking alcohol two months ago. Declines resources.

## 2023-05-19 NOTE — PROGRESS NOTE ADULT - SUBJECTIVE AND OBJECTIVE BOX
O/N: [facility] vanc level 5.1 1250 Vanc given                                ---------------------------------------------------------------------------  PLEASE CHECK WHEN PRESENT:  [  ] Heart Failure  [  ] Acute  [  ] Acute on Chronic  [  ] Chronic  -------------------------------------------------------------------  [  ]Diastolic [HFpEF]  [  ]Systolic [HFrEF]  [  ]Combined [HFpEF & HFrEF]  [  ] afib  [  ] hypertensive heart disease  [  ]Other:  -------------------------------------------------------------------  [ ] Respiratory failure  [ ] Acute cor pulmonale  [ ] Asthma/COPD Exacerbation  [ ] Pleural effusion  [ ] Aspiration pneumonia  -------------------------------------------------------------------  [  ]JAJA  [  ]ATN  [  ]Reneal Medullary Necrosis  [  ]Renal Cortical Necrosis  [  ]Other Pathological Lesions:    [  ]CKD 1  [  ]CKD 2  [  ]CKD 3  [  ]CKD 4  [  ]CKD 5  [  ]Other  -------------------------------------------------------------------  [  ]Diabetes  [  ] Diabetic PVD Ulcer  [  ] Neuropathic ulcer to DM  [  ] Diabetes with Nephropathy  [  ] Osteomyelitis due to diabetes  --------------------------------------------------------------------  [  ]Malnutrition: See Nutrition Note  [  ]Cachexia  [  ]Other:   [  ]Supplement Ordered:  [  ]Morbid Obesity (BMI >=40]  ---------------------------------------------------------------------  [ ] Sepsis/severe sepsis/septic shock  [ ] UTI  [ ] Pneumonia  -----------------------------------------------------------------------  [ ] Acidosis/alkalosis  [ ] Fluid overload  [ ] Hypokalemia  [ ] Hyperkalemia  [ ] Hypomagnesemia  [ ] Hypophosphatemia  [ ] Hyperphosphatemia  ------------------------------------------------------------------------  [ ] Acute blood loss anemia  [ ] Post op blood loss anemia  [ ] Iron deficiency anemia  [ ] Anemia due to chronic disease  [ ] Hypercoagulable state  ----------------------------------------------------------------------  [ ] Cerebral infarction  [ ] Transient ischemia attack  [ ] Encephalopathy      Assessment:  · Assessment	  81 yo f presented with hx of HTN and asthma p/w concern of right foot infection after a fall that resulted in an injury to her right foot. Failed PO antibiotics     Plan:  Vascular/infx foot wound  - Admit to Dr. Gabriel   - [facility] by level  - pending x-ray read  -pain control      HTN:   -continue home metoprolol       ID:   - blood cultures drawn, pending results    DVTppx: hsq    Activity: as tolerated    Diet: dash    Dispo: continue IV abx  O/N: BENNIE, [facility] level 5.1 1250 Vanc given      Subjective: Patient seen and examined at the bedside. No new complaints.     ROS:   Denies Headache, blurred vision, Chest Pain, SOB, Abdominal pain, nausea or vomiting     Social   heparin   Injectable 5000  metoprolol tartrate 25      Allergies    No Known Drug Allergies  unknown pain medication allergy (Other)    Intolerances        Vital Signs Last 24 Hrs  T(C): 36.2 (19 May 2023 04:59), Max: 36.7 (18 May 2023 14:19)  T(F): 97.2 (19 May 2023 04:59), Max: 98.1 (18 May 2023 14:19)  HR: 77 (19 May 2023 05:59) (76 - 92)  BP: 149/65 (19 May 2023 05:59) (104/58 - 149/65)  BP(mean): --  RR: 18 (19 May 2023 05:59) (17 - 19)  SpO2: 96% (19 May 2023 05:59) (94% - 96%)    Parameters below as of 19 May 2023 05:59  Patient On (Oxygen Delivery Method): room air      I&O's Summary    17 May 2023 07:01  -  18 May 2023 07:00  --------------------------------------------------------  IN: 120 mL / OUT: 400 mL / NET: -280 mL    18 May 2023 07:01  -  19 May 2023 06:51  --------------------------------------------------------  IN: 150 mL / OUT: 400 mL / NET: -250 mL      Physical Exam:   General: Supine on stretcher, no acute distress  Neuro: Alert and oriented. Sensation and motor function intact.   Pulm: Normal work of breathing.   Cardiovascular: RRR  Vascular: Palpable pedal pulses. Triphasic DP and PT.   Gastrointestinal: abdomen soft, nontender, non distended  Neuro: no focal deficits  Extremities: right sided bunion erythematous and warm localized to area of bunion, ulcerated with scant pus on dressing, Left foot slightly edematous, b/l motor and sensory function in tact    LABS:                        12.8   7.17  )-----------( 247      ( 18 May 2023 06:34 )             39.5     05-18    138  |  108  |  20  ----------------------------<  87  4.2   |  24  |  0.93    Ca    8.9      18 May 2023 06:34  Phos  3.6     05-18  Mg     2.1     05-18      PT/INR - ( 17 May 2023 20:51 )   PT: 12.0 sec;   INR: 1.01          PTT - ( 17 May 2023 20:51 )  PTT:35.2 sec        ---------------------------------------------------------------------------  PLEASE CHECK WHEN PRESENT:  [  ] Heart Failure  [  ] Acute  [  ] Acute on Chronic  [  ] Chronic  -------------------------------------------------------------------  [  ]Diastolic [HFpEF]  [  ]Systolic [HFrEF]  [  ]Combined [HFpEF & HFrEF]  [  ] afib  [  ] hypertensive heart disease  [  ]Other:  -------------------------------------------------------------------  [ ] Respiratory failure  [ ] Acute cor pulmonale  [ ] Asthma/COPD Exacerbation  [ ] Pleural effusion  [ ] Aspiration pneumonia  -------------------------------------------------------------------  [  ]JAJA  [  ]ATN  [  ]Reneal Medullary Necrosis  [  ]Renal Cortical Necrosis  [  ]Other Pathological Lesions:    [  ]CKD 1  [  ]CKD 2  [  ]CKD 3  [  ]CKD 4  [  ]CKD 5  [  ]Other  -------------------------------------------------------------------  [  ]Diabetes  [  ] Diabetic PVD Ulcer  [  ] Neuropathic ulcer to DM  [  ] Diabetes with Nephropathy  [  ] Osteomyelitis due to diabetes  --------------------------------------------------------------------  [  ]Malnutrition: See Nutrition Note  [  ]Cachexia  [  ]Other:   [  ]Supplement Ordered:  [  ]Morbid Obesity (BMI >=40]  ---------------------------------------------------------------------  [ ] Sepsis/severe sepsis/septic shock  [ ] UTI  [ ] Pneumonia  -----------------------------------------------------------------------  [ ] Acidosis/alkalosis  [ ] Fluid overload  [ ] Hypokalemia  [ ] Hyperkalemia  [ ] Hypomagnesemia  [ ] Hypophosphatemia  [ ] Hyperphosphatemia  ------------------------------------------------------------------------  [ ] Acute blood loss anemia  [ ] Post op blood loss anemia  [ ] Iron deficiency anemia  [ ] Anemia due to chronic disease  [ ] Hypercoagulable state  ----------------------------------------------------------------------  [ ] Cerebral infarction  [ ] Transient ischemia attack  [ ] Encephalopathy      Assessment:  · Assessment	  83 yo f presented with hx of HTN and asthma p/w concern of right foot infection after a fall that resulted in an injury to her right foot. Failed PO antibiotics     Plan:  Vascular/infx foot wound  - vanc by level  -MRI negative for osteo  -pain control      HTN:   -continue home metoprolol       ID:   - blood cultures - NGTD  -vanc by level       DVTppx: hsq    Activity: as tolerated    Diet: dash    Dispo: continue IV abx

## 2023-05-19 NOTE — PROGRESS NOTE ADULT - SUBJECTIVE AND OBJECTIVE BOX
Subjective:  No acute events overnight.  Patient is doing well today without new complaints.  Denies HA, CP, SOB, abdominal pain, nausea, vomiting, fever, chills or diarrhea.     Objective:   Vital Signs:  T(C): 36.1 (19 May 2023 09:35), Max: 36.7 (18 May 2023 14:19)  T(F): 96.9 (19 May 2023 09:35), Max: 98.1 (18 May 2023 14:19)  HR: 77 (19 May 2023 05:59) (77 - 92)  BP: 149/65 (19 May 2023 05:59) (104/58 - 149/65)  BP(mean): --  RR: 18 (19 May 2023 05:59) (17 - 19)  SpO2: 96% (19 May 2023 05:59) (95% - 96%)    Parameters below as of 19 May 2023 05:59  Patient On (Oxygen Delivery Method): room air    Physical Exam:  -Gen: NAD, resting in bed  -HEENT: EOMI, PERRL, no scleral icterus, no JVD, no bruits  -CV: normal S1 and S2  -Lungs: CTABL, normal respiratory effort on RA  -Ab: soft, NT, ND, normal BS  -Ext: no LE edema, right foot wrapped   -Neuro: A&O x 3, no focal deficits     Labs:                        12.5   6.90  )-----------( 243      ( 19 May 2023 06:44 )             38.3       05-19    139  |  108  |  20  ----------------------------<  88  4.0   |  20<L>  |  0.96    Ca    8.4      19 May 2023 06:44  Phos  3.0     05-19  Mg     2.1     05-19    Microbiology:     Culture - Blood (collected 05-17-23 @ 20:35)  Source: .Blood Blood-Peripheral  Preliminary Report (05-18-23 @ 22:01):    No growth at 1 day.    Culture - Blood (collected 05-17-23 @ 20:20)  Source: .Blood Blood-Peripheral  Preliminary Report (05-18-23 @ 22:01):    No growth at 1 day.    Medications:  MEDICATIONS  (STANDING):  heparin   Injectable 5000 Unit(s) SubCutaneous every 8 hours  metoprolol tartrate 25 milliGRAM(s) Oral two times a day    MEDICATIONS  (PRN):  albuterol    90 MICROgram(s) HFA Inhaler 2 Puff(s) Inhalation every 6 hours PRN Shortness of Breath and/or Wheezing  naproxen 375 milliGRAM(s) Oral every 8 hours PRN Moderate Pain (4 - 6)

## 2023-05-20 LAB
-  VANCOMYCIN: SIGNIFICANT CHANGE UP
ANION GAP SERPL CALC-SCNC: 10 MMOL/L — SIGNIFICANT CHANGE UP (ref 5–17)
BUN SERPL-MCNC: 18 MG/DL — SIGNIFICANT CHANGE UP (ref 7–23)
CALCIUM SERPL-MCNC: 8.5 MG/DL — SIGNIFICANT CHANGE UP (ref 8.4–10.5)
CHLORIDE SERPL-SCNC: 108 MMOL/L — SIGNIFICANT CHANGE UP (ref 96–108)
CO2 SERPL-SCNC: 22 MMOL/L — SIGNIFICANT CHANGE UP (ref 22–31)
CREAT SERPL-MCNC: 0.8 MG/DL — SIGNIFICANT CHANGE UP (ref 0.5–1.3)
EGFR: 74 ML/MIN/1.73M2 — SIGNIFICANT CHANGE UP
GLUCOSE SERPL-MCNC: 83 MG/DL — SIGNIFICANT CHANGE UP (ref 70–99)
HCT VFR BLD CALC: 39.9 % — SIGNIFICANT CHANGE UP (ref 34.5–45)
HGB BLD-MCNC: 12.9 G/DL — SIGNIFICANT CHANGE UP (ref 11.5–15.5)
MAGNESIUM SERPL-MCNC: 2.1 MG/DL — SIGNIFICANT CHANGE UP (ref 1.6–2.6)
MCHC RBC-ENTMCNC: 31.4 PG — SIGNIFICANT CHANGE UP (ref 27–34)
MCHC RBC-ENTMCNC: 32.3 GM/DL — SIGNIFICANT CHANGE UP (ref 32–36)
MCV RBC AUTO: 97.1 FL — SIGNIFICANT CHANGE UP (ref 80–100)
METHOD TYPE: SIGNIFICANT CHANGE UP
NRBC # BLD: 0 /100 WBCS — SIGNIFICANT CHANGE UP (ref 0–0)
PHOSPHATE SERPL-MCNC: 3.3 MG/DL — SIGNIFICANT CHANGE UP (ref 2.5–4.5)
PLATELET # BLD AUTO: 229 K/UL — SIGNIFICANT CHANGE UP (ref 150–400)
POTASSIUM SERPL-MCNC: 4.1 MMOL/L — SIGNIFICANT CHANGE UP (ref 3.5–5.3)
POTASSIUM SERPL-SCNC: 4.1 MMOL/L — SIGNIFICANT CHANGE UP (ref 3.5–5.3)
RBC # BLD: 4.11 M/UL — SIGNIFICANT CHANGE UP (ref 3.8–5.2)
RBC # FLD: 14.1 % — SIGNIFICANT CHANGE UP (ref 10.3–14.5)
SODIUM SERPL-SCNC: 140 MMOL/L — SIGNIFICANT CHANGE UP (ref 135–145)
VANCOMYCIN FLD-MCNC: 7.7 UG/ML — SIGNIFICANT CHANGE UP
WBC # BLD: 7.54 K/UL — SIGNIFICANT CHANGE UP (ref 3.8–10.5)
WBC # FLD AUTO: 7.54 K/UL — SIGNIFICANT CHANGE UP (ref 3.8–10.5)

## 2023-05-20 PROCEDURE — 99232 SBSQ HOSP IP/OBS MODERATE 35: CPT

## 2023-05-20 RX ORDER — VANCOMYCIN HCL 1 G
1500 VIAL (EA) INTRAVENOUS ONCE
Refills: 0 | Status: COMPLETED | OUTPATIENT
Start: 2023-05-20 | End: 2023-05-20

## 2023-05-20 RX ORDER — ACETAMINOPHEN 500 MG
1000 TABLET ORAL ONCE
Refills: 0 | Status: COMPLETED | OUTPATIENT
Start: 2023-05-20 | End: 2023-05-20

## 2023-05-20 RX ADMIN — Medication 25 MILLIGRAM(S): at 17:36

## 2023-05-20 RX ADMIN — Medication 25 MILLIGRAM(S): at 06:52

## 2023-05-20 RX ADMIN — Medication 1000 MILLIGRAM(S): at 10:44

## 2023-05-20 RX ADMIN — HEPARIN SODIUM 5000 UNIT(S): 5000 INJECTION INTRAVENOUS; SUBCUTANEOUS at 15:04

## 2023-05-20 RX ADMIN — Medication 300 MILLIGRAM(S): at 02:15

## 2023-05-20 RX ADMIN — Medication 400 MILLIGRAM(S): at 09:56

## 2023-05-20 RX ADMIN — HEPARIN SODIUM 5000 UNIT(S): 5000 INJECTION INTRAVENOUS; SUBCUTANEOUS at 21:48

## 2023-05-20 RX ADMIN — HEPARIN SODIUM 5000 UNIT(S): 5000 INJECTION INTRAVENOUS; SUBCUTANEOUS at 06:51

## 2023-05-20 NOTE — PROGRESS NOTE ADULT - SUBJECTIVE AND OBJECTIVE BOX
DAILY PROGRESS NOTE    S: Patient reports she is feeling well and thinks her foot is healing.     O:     T(C): 36.4 (05-20-23 @ 18:37), Max: 36.8 (05-20-23 @ 09:21)  HR: 72 (05-20-23 @ 18:43) (70 - 79)  BP: 122/63 (05-20-23 @ 18:43) (121/59 - 141/69)  RR: 16 (05-20-23 @ 18:43) (16 - 18)  SpO2: 99% (05-20-23 @ 18:43) (94% - 99%)    Physical Exam:     Physical Exam:   General: Seated in chair, awake and alert.   Neuro: Alert and oriented. Sensation and motor function intact.   Pulm: Normal work of breathing.   Cardiovascular: RRR  Vascular: Palpable pedal pulses. Triphasic DP and PT.   Gastrointestinal: abdomen soft, nontender, non distended  Neuro: no focal deficits  Extremities: right sided bunion with healthy appearing base, dressed in betadine. Left foot bunions similarly healthy.     Labs:                           12.9   7.54  )-----------( 229      ( 20 May 2023 05:30 )             39.9   05-20    140  |  108  |  18  ----------------------------<  83  4.1   |  22  |  0.80    Ca    8.5      20 May 2023 05:30  Phos  3.3     05-20  Mg     2.1     05-20    Rads: none    A/P:   ---------------------------------------------------------------------------  PLEASE CHECK WHEN PRESENT:  [  ] Heart Failure  [  ] Acute  [  ] Acute on Chronic  [  ] Chronic  -------------------------------------------------------------------  [  ]Diastolic [HFpEF]  [  ]Systolic [HFrEF]  [  ]Combined [HFpEF & HFrEF]  [  ] afib  [  ] hypertensive heart disease  [  ]Other:  -------------------------------------------------------------------  [ ] Respiratory failure  [ ] Acute cor pulmonale  [ ] Asthma/COPD Exacerbation  [ ] Pleural effusion  [ ] Aspiration pneumonia  -------------------------------------------------------------------  [  ]JAJA  [  ]ATN  [  ]Reneal Medullary Necrosis  [  ]Renal Cortical Necrosis  [  ]Other Pathological Lesions:    [  ]CKD 1  [  ]CKD 2  [  ]CKD 3  [  ]CKD 4  [  ]CKD 5  [  ]Other  -------------------------------------------------------------------  [  ]Diabetes  [  ] Diabetic PVD Ulcer  [  ] Neuropathic ulcer to DM  [  ] Diabetes with Nephropathy  [  ] Osteomyelitis due to diabetes  --------------------------------------------------------------------  [  ]Malnutrition: See Nutrition Note  [  ]Cachexia  [  ]Other:   [  ]Supplement Ordered:  [  ]Morbid Obesity (BMI >=40]  ---------------------------------------------------------------------  [ ] Sepsis/severe sepsis/septic shock  [ ] UTI  [ ] Pneumonia  -----------------------------------------------------------------------  [ ] Acidosis/alkalosis  [ ] Fluid overload  [ ] Hypokalemia  [ ] Hyperkalemia  [ ] Hypomagnesemia  [ ] Hypophosphatemia  [ ] Hyperphosphatemia  ------------------------------------------------------------------------  [ ] Acute blood loss anemia  [ ] Post op blood loss anemia  [ ] Iron deficiency anemia  [ ] Anemia due to chronic disease  [ ] Hypercoagulable state  ----------------------------------------------------------------------  [ ] Cerebral infarction  [ ] Transient ischemia attack  [ ] Encephalopathy       Assessment:  · Assessment	  83 yo f presented with hx of HTN and asthma p/w concern of right foot infection after a fall that resulted in an injury to her right foot. Failed PO antibiotics     Plan:  Vascular/infx foot wound  - vanc by level  -MRI negative for osteo  -pain control      HTN:   -continue home metoprolol       ID:   - blood cultures - NGTD  -vanc by level       DVTppx: hsq    Activity: as tolerated    Diet: dash    Dispo: continue IV abx

## 2023-05-20 NOTE — PROGRESS NOTE ADULT - SUBJECTIVE AND OBJECTIVE BOX
Subjective:  No acute events overnight.  Patient is doing well today without new complaints.  Denies HA, CP, SOB, abdominal pain, nausea, vomiting, fever, chills or diarrhea.     Objective:   Vital Signs Last 24 Hrs  T(C): 36.8 (20 May 2023 09:21), Max: 36.8 (19 May 2023 18:25)  T(F): 98.3 (20 May 2023 09:21), Max: 98.3 (20 May 2023 09:21)  HR: 79 (20 May 2023 09:47) (70 - 79)  BP: 141/69 (20 May 2023 09:47) (124/60 - 141/69)  BP(mean): 84 (20 May 2023 06:58) (84 - 89)  RR: 18 (20 May 2023 09:47) (18 - 18)  SpO2: 99% (20 May 2023 09:47) (94% - 99%)    Parameters below as of 20 May 2023 09:47  Patient On (Oxygen Delivery Method): room air    Physical Exam:  -Gen: NAD, resting in bed  -HEENT: EOMI, PERRL, no scleral icterus, no JVD, no bruits  -CV: normal S1 and S2  -Lungs: CTABL, normal respiratory effort on RA  -Ab: soft, NT, ND, normal BS  -Ext: no LE edema, right foot wrapped   -Neuro: A&O x 3, no focal deficits     Labs:                        12.9   7.54  )-----------( 229      ( 20 May 2023 05:30 )             39.9       05-20    140  |  108  |  18  ----------------------------<  83  4.1   |  22  |  0.80    Ca    8.5      20 May 2023 05:30  Phos  3.3     05-20  Mg     2.1     05-20    Microbiology:     Culture - Blood (collected 05-17-23 @ 20:35)  Source: .Blood Blood-Peripheral  Preliminary Report (05-19-23 @ 22:00):    No growth at 2 days.    Culture - Blood (collected 05-17-23 @ 20:20)  Source: .Blood Blood-Peripheral  Preliminary Report (05-19-23 @ 22:00):    No growth at 2 days.     Medications:  MEDICATIONS  (STANDING):  heparin   Injectable 5000 Unit(s) SubCutaneous every 8 hours  metoprolol tartrate 25 milliGRAM(s) Oral two times a day    MEDICATIONS  (PRN):  albuterol    90 MICROgram(s) HFA Inhaler 2 Puff(s) Inhalation every 6 hours PRN Shortness of Breath and/or Wheezing  naproxen 375 milliGRAM(s) Oral every 8 hours PRN Moderate Pain (4 - 6)

## 2023-05-21 LAB
-  CLINDAMYCIN: SIGNIFICANT CHANGE UP
-  DAPTOMYCIN: SIGNIFICANT CHANGE UP
-  ERYTHROMYCIN: SIGNIFICANT CHANGE UP
-  LINEZOLID: SIGNIFICANT CHANGE UP
-  OXACILLIN: SIGNIFICANT CHANGE UP
-  RIFAMPIN: SIGNIFICANT CHANGE UP
-  TETRACYCLINE: SIGNIFICANT CHANGE UP
-  TRIMETHOPRIM/SULFAMETHOXAZOLE: SIGNIFICANT CHANGE UP
ANION GAP SERPL CALC-SCNC: 10 MMOL/L — SIGNIFICANT CHANGE UP (ref 5–17)
BUN SERPL-MCNC: 15 MG/DL — SIGNIFICANT CHANGE UP (ref 7–23)
CALCIUM SERPL-MCNC: 8.9 MG/DL — SIGNIFICANT CHANGE UP (ref 8.4–10.5)
CHLORIDE SERPL-SCNC: 108 MMOL/L — SIGNIFICANT CHANGE UP (ref 96–108)
CO2 SERPL-SCNC: 19 MMOL/L — LOW (ref 22–31)
CREAT SERPL-MCNC: 0.81 MG/DL — SIGNIFICANT CHANGE UP (ref 0.5–1.3)
CULTURE RESULTS: SIGNIFICANT CHANGE UP
EGFR: 73 ML/MIN/1.73M2 — SIGNIFICANT CHANGE UP
GLUCOSE SERPL-MCNC: 81 MG/DL — SIGNIFICANT CHANGE UP (ref 70–99)
HCT VFR BLD CALC: 42.4 % — SIGNIFICANT CHANGE UP (ref 34.5–45)
HGB BLD-MCNC: 13.4 G/DL — SIGNIFICANT CHANGE UP (ref 11.5–15.5)
MAGNESIUM SERPL-MCNC: 2.1 MG/DL — SIGNIFICANT CHANGE UP (ref 1.6–2.6)
MCHC RBC-ENTMCNC: 31.4 PG — SIGNIFICANT CHANGE UP (ref 27–34)
MCHC RBC-ENTMCNC: 31.6 GM/DL — LOW (ref 32–36)
MCV RBC AUTO: 99.3 FL — SIGNIFICANT CHANGE UP (ref 80–100)
METHOD TYPE: SIGNIFICANT CHANGE UP
NRBC # BLD: 0 /100 WBCS — SIGNIFICANT CHANGE UP (ref 0–0)
ORGANISM # SPEC MICROSCOPIC CNT: SIGNIFICANT CHANGE UP
PHOSPHATE SERPL-MCNC: 3.3 MG/DL — SIGNIFICANT CHANGE UP (ref 2.5–4.5)
PLATELET # BLD AUTO: 217 K/UL — SIGNIFICANT CHANGE UP (ref 150–400)
POTASSIUM SERPL-MCNC: 4.4 MMOL/L — SIGNIFICANT CHANGE UP (ref 3.5–5.3)
POTASSIUM SERPL-SCNC: 4.4 MMOL/L — SIGNIFICANT CHANGE UP (ref 3.5–5.3)
RBC # BLD: 4.27 M/UL — SIGNIFICANT CHANGE UP (ref 3.8–5.2)
RBC # FLD: 14.1 % — SIGNIFICANT CHANGE UP (ref 10.3–14.5)
SODIUM SERPL-SCNC: 137 MMOL/L — SIGNIFICANT CHANGE UP (ref 135–145)
SPECIMEN SOURCE: SIGNIFICANT CHANGE UP
VANCOMYCIN TROUGH SERPL-MCNC: 10.6 UG/ML — SIGNIFICANT CHANGE UP (ref 10–20)
WBC # BLD: 8.61 K/UL — SIGNIFICANT CHANGE UP (ref 3.8–10.5)
WBC # FLD AUTO: 8.61 K/UL — SIGNIFICANT CHANGE UP (ref 3.8–10.5)

## 2023-05-21 PROCEDURE — 99232 SBSQ HOSP IP/OBS MODERATE 35: CPT

## 2023-05-21 PROCEDURE — 99231 SBSQ HOSP IP/OBS SF/LOW 25: CPT

## 2023-05-21 RX ORDER — VANCOMYCIN HCL 1 G
1750 VIAL (EA) INTRAVENOUS ONCE
Refills: 0 | Status: COMPLETED | OUTPATIENT
Start: 2023-05-21 | End: 2023-05-21

## 2023-05-21 RX ORDER — GABAPENTIN 400 MG/1
100 CAPSULE ORAL DAILY
Refills: 0 | Status: DISCONTINUED | OUTPATIENT
Start: 2023-05-21 | End: 2023-05-22

## 2023-05-21 RX ADMIN — HEPARIN SODIUM 5000 UNIT(S): 5000 INJECTION INTRAVENOUS; SUBCUTANEOUS at 06:03

## 2023-05-21 RX ADMIN — HEPARIN SODIUM 5000 UNIT(S): 5000 INJECTION INTRAVENOUS; SUBCUTANEOUS at 14:06

## 2023-05-21 RX ADMIN — GABAPENTIN 100 MILLIGRAM(S): 400 CAPSULE ORAL at 09:38

## 2023-05-21 RX ADMIN — Medication 375 MILLIGRAM(S): at 07:43

## 2023-05-21 RX ADMIN — HEPARIN SODIUM 5000 UNIT(S): 5000 INJECTION INTRAVENOUS; SUBCUTANEOUS at 22:02

## 2023-05-21 RX ADMIN — Medication 25 MILLIGRAM(S): at 18:37

## 2023-05-21 RX ADMIN — Medication 250 MILLIGRAM(S): at 04:21

## 2023-05-21 RX ADMIN — Medication 375 MILLIGRAM(S): at 06:43

## 2023-05-21 RX ADMIN — Medication 25 MILLIGRAM(S): at 06:03

## 2023-05-21 NOTE — PROGRESS NOTE ADULT - SUBJECTIVE AND OBJECTIVE BOX
O/N: BENNIE. Vanc increased to 1750mg q24h.           ---------------------------------------------------------------------------  PLEASE CHECK WHEN PRESENT:  [  ] Heart Failure  [  ] Acute  [  ] Acute on Chronic  [  ] Chronic  -------------------------------------------------------------------  [  ]Diastolic [HFpEF]  [  ]Systolic [HFrEF]  [  ]Combined [HFpEF & HFrEF]  [  ] afib  [  ] hypertensive heart disease  [  ]Other:  -------------------------------------------------------------------  [ ] Respiratory failure  [ ] Acute cor pulmonale  [ ] Asthma/COPD Exacerbation  [ ] Pleural effusion  [ ] Aspiration pneumonia  -------------------------------------------------------------------  [  ]JAJA  [  ]ATN  [  ]Reneal Medullary Necrosis  [  ]Renal Cortical Necrosis  [  ]Other Pathological Lesions:    [  ]CKD 1  [  ]CKD 2  [  ]CKD 3  [  ]CKD 4  [  ]CKD 5  [  ]Other  -------------------------------------------------------------------  [  ]Diabetes  [  ] Diabetic PVD Ulcer  [  ] Neuropathic ulcer to DM  [  ] Diabetes with Nephropathy  [  ] Osteomyelitis due to diabetes  --------------------------------------------------------------------  [  ]Malnutrition: See Nutrition Note  [  ]Cachexia  [  ]Other:   [  ]Supplement Ordered:  [  ]Morbid Obesity (BMI >=40]  ---------------------------------------------------------------------  [ ] Sepsis/severe sepsis/septic shock  [ ] UTI  [ ] Pneumonia  -----------------------------------------------------------------------  [ ] Acidosis/alkalosis  [ ] Fluid overload  [ ] Hypokalemia  [ ] Hyperkalemia  [ ] Hypomagnesemia  [ ] Hypophosphatemia  [ ] Hyperphosphatemia  ------------------------------------------------------------------------  [ ] Acute blood loss anemia  [ ] Post op blood loss anemia  [ ] Iron deficiency anemia  [ ] Anemia due to chronic disease  [ ] Hypercoagulable state  ----------------------------------------------------------------------  [ ] Cerebral infarction  [ ] Transient ischemia attack  [ ] Encephalopathy       Assessment:  · Assessment	  81 yo f presented with hx of HTN and asthma p/w concern of right foot infection after a fall that resulted in an injury to her right foot. Failed PO antibiotics     Plan:  Vascular/infx foot wound  - vanc by level  -MRI negative for osteo  -pain control      HTN:   -continue home metoprolol       ID:   - blood cultures - NGTD  -vanc by level       DVTppx: hsq    Activity: as tolerated    Diet: dash    Dispo: continue IV abx  O/N: BENNIE. Vanc increased to 1750mg q24h.     Subjective: Patient seen and examined. C/o neuropathic pain of extremities that comes and goes.     ROS:   Denies Headache, blurred vision, Chest Pain, SOB, Abdominal pain, nausea or vomiting     Social   heparin   Injectable 5000  metoprolol tartrate 25      Allergies    No Known Drug Allergies  unknown pain medication allergy (Other)    Intolerances        Vital Signs Last 24 Hrs  T(C): 36.6 (21 May 2023 06:01), Max: 36.8 (20 May 2023 09:21)  T(F): 97.8 (21 May 2023 06:01), Max: 98.3 (20 May 2023 09:21)  HR: 74 (21 May 2023 06:01) (72 - 79)  BP: 140/66 (21 May 2023 06:01) (119/56 - 154/64)  BP(mean): 80 (21 May 2023 00:56) (80 - 80)  RR: 18 (21 May 2023 06:01) (16 - 18)  SpO2: 96% (21 May 2023 06:01) (96% - 99%)    Parameters below as of 21 May 2023 06:01  Patient On (Oxygen Delivery Method): room air      I&O's Summary    20 May 2023 07:01  -  21 May 2023 07:00  --------------------------------------------------------  IN: 300 mL / OUT: 600 mL / NET: -300 mL        Physical Exam:   General: Seated in chair, awake and alert.   Neuro: Alert and oriented. Sensation and motor function intact.   Pulm: Normal work of breathing.   Cardiovascular: RRR  Vascular: Palpable pedal pulses. Triphasic DP and PT.   Gastrointestinal: abdomen soft, nontender, non distended  Neuro: no focal deficits  Extremities: right sided bunion with healthy appearing base, dressed in betadine. Left foot bunions similarly healthy, two dry pea sized ulcers along lateral left foot also tx w/ betadine and dry dressing.     LABS:                        13.4   8.61  )-----------( 217      ( 21 May 2023 05:30 )             42.4     05-21    137  |  108  |  15  ----------------------------<  81  4.4   |  19<L>  |  0.81    Ca    8.9      21 May 2023 05:30  Phos  3.3     05-21  Mg     2.1     05-21          ---------------------------------------------------------------------------  PLEASE CHECK WHEN PRESENT:  [  ] Heart Failure  [  ] Acute  [  ] Acute on Chronic  [  ] Chronic  -------------------------------------------------------------------  [  ]Diastolic [HFpEF]  [  ]Systolic [HFrEF]  [  ]Combined [HFpEF & HFrEF]  [  ] afib  [  ] hypertensive heart disease  [  ]Other:  -------------------------------------------------------------------  [ ] Respiratory failure  [ ] Acute cor pulmonale  [ ] Asthma/COPD Exacerbation  [ ] Pleural effusion  [ ] Aspiration pneumonia  -------------------------------------------------------------------  [  ]JAJA  [  ]ATN  [  ]Reneal Medullary Necrosis  [  ]Renal Cortical Necrosis  [  ]Other Pathological Lesions:    [  ]CKD 1  [  ]CKD 2  [  ]CKD 3  [  ]CKD 4  [  ]CKD 5  [  ]Other  -------------------------------------------------------------------  [  ]Diabetes  [  ] Diabetic PVD Ulcer  [  ] Neuropathic ulcer to DM  [  ] Diabetes with Nephropathy  [  ] Osteomyelitis due to diabetes  --------------------------------------------------------------------  [  ]Malnutrition: See Nutrition Note  [  ]Cachexia  [  ]Other:   [  ]Supplement Ordered:  [  ]Morbid Obesity (BMI >=40]  ---------------------------------------------------------------------  [ ] Sepsis/severe sepsis/septic shock  [ ] UTI  [ ] Pneumonia  -----------------------------------------------------------------------  [ ] Acidosis/alkalosis  [ ] Fluid overload  [ ] Hypokalemia  [ ] Hyperkalemia  [ ] Hypomagnesemia  [ ] Hypophosphatemia  [ ] Hyperphosphatemia  ------------------------------------------------------------------------  [ ] Acute blood loss anemia  [ ] Post op blood loss anemia  [ ] Iron deficiency anemia  [ ] Anemia due to chronic disease  [ ] Hypercoagulable state  ----------------------------------------------------------------------  [ ] Cerebral infarction  [ ] Transient ischemia attack  [ ] Encephalopathy       Assessment:  83 yo f presented with hx of HTN and asthma p/w concern of right foot infection after a fall that resulted in an injury to her right foot. Failed PO antibiotics     Plan:  Vascular/infx foot wound  - vanc by level  -MRI negative for osteo  -pain control      HTN:   -continue home metoprolol       ID:   - blood cultures - NGTD  -vanc by level       DVTppx: hsq    Activity: as tolerated    Diet: dash    Dispo: continue IV abx         ILeila PA-C, have personally seen and examined the patient. I have reviewed all pertinent imaging and laboratory findings.

## 2023-05-21 NOTE — PROGRESS NOTE ADULT - SUBJECTIVE AND OBJECTIVE BOX
Subjective:  No acute events overnight.  Patient is doing well today - still with sharp/burning pain in bilateral feet.  Denies HA, CP, SOB, abdominal pain, nausea, vomiting, fever, chills or diarrhea.     Objective:   Vital Signs Last 24 Hrs  T(C): 36.7 (21 May 2023 09:26), Max: 36.7 (21 May 2023 09:26)  T(F): 98.1 (21 May 2023 09:26), Max: 98.1 (21 May 2023 09:26)  HR: 72 (21 May 2023 09:42) (72 - 74)  BP: 134/69 (21 May 2023 09:42) (119/56 - 154/64)  BP(mean): 80 (21 May 2023 00:56) (80 - 80)  RR: 18 (21 May 2023 09:42) (16 - 18)  SpO2: 97% (21 May 2023 09:42) (96% - 99%)    Parameters below as of 21 May 2023 09:42  Patient On (Oxygen Delivery Method): room air    Physical Exam:  -Gen: NAD, resting in bed  -HEENT: EOMI, PERRL, no scleral icterus, no JVD, no bruits  -CV: normal S1 and S2  -Lungs: CTABL, normal respiratory effort on RA  -Ab: soft, NT, ND, normal BS  -Ext: no LE edema, right foot wrapped   -Neuro: A&O x 3, no focal deficits     Labs:                        13.4   8.61  )-----------( 217      ( 21 May 2023 05:30 )             42.4       05-21    137  |  108  |  15  ----------------------------<  81  4.4   |  19<L>  |  0.81    Ca    8.9      21 May 2023 05:30  Phos  3.3     05-21  Mg     2.1     05-21    Microbiology:     Culture - Blood (collected 05-17-23 @ 20:35)  Source: .Blood Blood-Peripheral  Preliminary Report (05-20-23 @ 22:00):    No growth at 3 days.    Culture - Blood (collected 05-17-23 @ 20:20)  Source: .Blood Blood-Peripheral  Preliminary Report (05-20-23 @ 22:00):    No growth at 3 days.    Medications:  MEDICATIONS  (STANDING):  gabapentin 100 milliGRAM(s) Oral daily  heparin   Injectable 5000 Unit(s) SubCutaneous every 8 hours  metoprolol tartrate 25 milliGRAM(s) Oral two times a day    MEDICATIONS  (PRN):  albuterol    90 MICROgram(s) HFA Inhaler 2 Puff(s) Inhalation every 6 hours PRN Shortness of Breath and/or Wheezing  naproxen 375 milliGRAM(s) Oral every 8 hours PRN Moderate Pain (4 - 6)

## 2023-05-22 ENCOUNTER — TRANSCRIPTION ENCOUNTER (OUTPATIENT)
Age: 83
End: 2023-05-22

## 2023-05-22 VITALS
SYSTOLIC BLOOD PRESSURE: 128 MMHG | RESPIRATION RATE: 18 BRPM | DIASTOLIC BLOOD PRESSURE: 65 MMHG | OXYGEN SATURATION: 96 %

## 2023-05-22 PROBLEM — F90.9 ATTENTION-DEFICIT HYPERACTIVITY DISORDER, UNSPECIFIED TYPE: Chronic | Status: ACTIVE | Noted: 2023-05-17

## 2023-05-22 PROBLEM — J45.909 UNSPECIFIED ASTHMA, UNCOMPLICATED: Chronic | Status: ACTIVE | Noted: 2023-05-17

## 2023-05-22 PROBLEM — I10 ESSENTIAL (PRIMARY) HYPERTENSION: Chronic | Status: ACTIVE | Noted: 2023-05-17

## 2023-05-22 LAB
ANION GAP SERPL CALC-SCNC: 9 MMOL/L — SIGNIFICANT CHANGE UP (ref 5–17)
BASOPHILS # BLD AUTO: 0.07 K/UL — SIGNIFICANT CHANGE UP (ref 0–0.2)
BASOPHILS NFR BLD AUTO: 1 % — SIGNIFICANT CHANGE UP (ref 0–2)
BUN SERPL-MCNC: 20 MG/DL — SIGNIFICANT CHANGE UP (ref 7–23)
CALCIUM SERPL-MCNC: 9.2 MG/DL — SIGNIFICANT CHANGE UP (ref 8.4–10.5)
CHLORIDE SERPL-SCNC: 106 MMOL/L — SIGNIFICANT CHANGE UP (ref 96–108)
CO2 SERPL-SCNC: 23 MMOL/L — SIGNIFICANT CHANGE UP (ref 22–31)
CREAT SERPL-MCNC: 0.84 MG/DL — SIGNIFICANT CHANGE UP (ref 0.5–1.3)
CULTURE RESULTS: SIGNIFICANT CHANGE UP
CULTURE RESULTS: SIGNIFICANT CHANGE UP
EGFR: 70 ML/MIN/1.73M2 — SIGNIFICANT CHANGE UP
EOSINOPHIL # BLD AUTO: 0.28 K/UL — SIGNIFICANT CHANGE UP (ref 0–0.5)
EOSINOPHIL NFR BLD AUTO: 3.8 % — SIGNIFICANT CHANGE UP (ref 0–6)
GLUCOSE SERPL-MCNC: 88 MG/DL — SIGNIFICANT CHANGE UP (ref 70–99)
HCT VFR BLD CALC: 37.4 % — SIGNIFICANT CHANGE UP (ref 34.5–45)
HGB BLD-MCNC: 12.3 G/DL — SIGNIFICANT CHANGE UP (ref 11.5–15.5)
IMM GRANULOCYTES NFR BLD AUTO: 0.4 % — SIGNIFICANT CHANGE UP (ref 0–0.9)
LYMPHOCYTES # BLD AUTO: 2.01 K/UL — SIGNIFICANT CHANGE UP (ref 1–3.3)
LYMPHOCYTES # BLD AUTO: 27.3 % — SIGNIFICANT CHANGE UP (ref 13–44)
MCHC RBC-ENTMCNC: 31.1 PG — SIGNIFICANT CHANGE UP (ref 27–34)
MCHC RBC-ENTMCNC: 32.9 GM/DL — SIGNIFICANT CHANGE UP (ref 32–36)
MCV RBC AUTO: 94.4 FL — SIGNIFICANT CHANGE UP (ref 80–100)
MONOCYTES # BLD AUTO: 0.71 K/UL — SIGNIFICANT CHANGE UP (ref 0–0.9)
MONOCYTES NFR BLD AUTO: 9.6 % — SIGNIFICANT CHANGE UP (ref 2–14)
NEUTROPHILS # BLD AUTO: 4.26 K/UL — SIGNIFICANT CHANGE UP (ref 1.8–7.4)
NEUTROPHILS NFR BLD AUTO: 57.9 % — SIGNIFICANT CHANGE UP (ref 43–77)
NRBC # BLD: 0 /100 WBCS — SIGNIFICANT CHANGE UP (ref 0–0)
PLATELET # BLD AUTO: 233 K/UL — SIGNIFICANT CHANGE UP (ref 150–400)
POTASSIUM SERPL-MCNC: 4.4 MMOL/L — SIGNIFICANT CHANGE UP (ref 3.5–5.3)
POTASSIUM SERPL-SCNC: 4.4 MMOL/L — SIGNIFICANT CHANGE UP (ref 3.5–5.3)
RBC # BLD: 3.96 M/UL — SIGNIFICANT CHANGE UP (ref 3.8–5.2)
RBC # FLD: 14.1 % — SIGNIFICANT CHANGE UP (ref 10.3–14.5)
SODIUM SERPL-SCNC: 138 MMOL/L — SIGNIFICANT CHANGE UP (ref 135–145)
SPECIMEN SOURCE: SIGNIFICANT CHANGE UP
SPECIMEN SOURCE: SIGNIFICANT CHANGE UP
VANCOMYCIN TROUGH SERPL-MCNC: 11.8 UG/ML — SIGNIFICANT CHANGE UP (ref 10–20)
WBC # BLD: 7.36 K/UL — SIGNIFICANT CHANGE UP (ref 3.8–10.5)
WBC # FLD AUTO: 7.36 K/UL — SIGNIFICANT CHANGE UP (ref 3.8–10.5)

## 2023-05-22 PROCEDURE — 87040 BLOOD CULTURE FOR BACTERIA: CPT

## 2023-05-22 PROCEDURE — 86900 BLOOD TYPING SEROLOGIC ABO: CPT

## 2023-05-22 PROCEDURE — 36415 COLL VENOUS BLD VENIPUNCTURE: CPT

## 2023-05-22 PROCEDURE — 99285 EMERGENCY DEPT VISIT HI MDM: CPT

## 2023-05-22 PROCEDURE — 85610 PROTHROMBIN TIME: CPT

## 2023-05-22 PROCEDURE — 80202 ASSAY OF VANCOMYCIN: CPT

## 2023-05-22 PROCEDURE — 84100 ASSAY OF PHOSPHORUS: CPT

## 2023-05-22 PROCEDURE — 73630 X-RAY EXAM OF FOOT: CPT

## 2023-05-22 PROCEDURE — 85027 COMPLETE CBC AUTOMATED: CPT

## 2023-05-22 PROCEDURE — 96372 THER/PROPH/DIAG INJ SC/IM: CPT

## 2023-05-22 PROCEDURE — 87186 SC STD MICRODIL/AGAR DIL: CPT

## 2023-05-22 PROCEDURE — 86901 BLOOD TYPING SEROLOGIC RH(D): CPT

## 2023-05-22 PROCEDURE — 85025 COMPLETE CBC W/AUTO DIFF WBC: CPT

## 2023-05-22 PROCEDURE — 83735 ASSAY OF MAGNESIUM: CPT

## 2023-05-22 PROCEDURE — 82962 GLUCOSE BLOOD TEST: CPT

## 2023-05-22 PROCEDURE — 99233 SBSQ HOSP IP/OBS HIGH 50: CPT

## 2023-05-22 PROCEDURE — 86850 RBC ANTIBODY SCREEN: CPT

## 2023-05-22 PROCEDURE — 85730 THROMBOPLASTIN TIME PARTIAL: CPT

## 2023-05-22 PROCEDURE — 73718 MRI LOWER EXTREMITY W/O DYE: CPT

## 2023-05-22 PROCEDURE — 96374 THER/PROPH/DIAG INJ IV PUSH: CPT

## 2023-05-22 PROCEDURE — 87181 SC STD AGAR DILUTION PER AGT: CPT

## 2023-05-22 PROCEDURE — 80048 BASIC METABOLIC PNL TOTAL CA: CPT

## 2023-05-22 PROCEDURE — 85652 RBC SED RATE AUTOMATED: CPT

## 2023-05-22 PROCEDURE — 87070 CULTURE OTHR SPECIMN AEROBIC: CPT

## 2023-05-22 PROCEDURE — 86140 C-REACTIVE PROTEIN: CPT

## 2023-05-22 RX ORDER — GABAPENTIN 400 MG/1
1 CAPSULE ORAL
Qty: 30 | Refills: 0
Start: 2023-05-22 | End: 2023-06-20

## 2023-05-22 RX ORDER — VANCOMYCIN HCL 1 G
1750 VIAL (EA) INTRAVENOUS ONCE
Refills: 0 | Status: COMPLETED | OUTPATIENT
Start: 2023-05-22 | End: 2023-05-22

## 2023-05-22 RX ORDER — ALBUTEROL 90 UG/1
2 AEROSOL, METERED ORAL
Qty: 0 | Refills: 0 | DISCHARGE
Start: 2023-05-22

## 2023-05-22 RX ADMIN — Medication 250 MILLIGRAM(S): at 04:57

## 2023-05-22 RX ADMIN — GABAPENTIN 100 MILLIGRAM(S): 400 CAPSULE ORAL at 11:19

## 2023-05-22 RX ADMIN — HEPARIN SODIUM 5000 UNIT(S): 5000 INJECTION INTRAVENOUS; SUBCUTANEOUS at 05:41

## 2023-05-22 NOTE — DISCHARGE NOTE PROVIDER - NSDCMRMEDTOKEN_GEN_ALL_CORE_FT
albuterol 90 mcg/inh inhalation aerosol: 2 puff(s) inhaled every 6 hours As needed Shortness of Breath and/or Wheezing  Bactrim  mg-160 mg oral tablet: 1 tab(s) orally 2 times a day  gabapentin 100 mg oral capsule: 1 cap(s) orally once a day  metoprolol tartrate 25 mg oral tablet: 1 orally 2 times a day  naproxen 375 mg oral tablet: 1 tab(s) orally every 8 hours As needed Moderate Pain (4 - 6)   albuterol 90 mcg/inh inhalation aerosol: 2 puff(s) inhaled every 6 hours As needed Shortness of Breath and/or Wheezing  gabapentin 100 mg oral capsule: 1 cap(s) orally once a day  metoprolol tartrate 25 mg oral tablet: 1 orally 2 times a day  naproxen 375 mg oral tablet: 1 tab(s) orally every 8 hours As needed Moderate Pain (4 - 6)   albuterol 90 mcg/inh inhalation aerosol: 2 puff(s) inhaled every 6 hours As needed Shortness of Breath and/or Wheezing  Bactrim  mg-160 mg oral tablet: 1 tab(s) orally every 12 hours MDD: 2 tablets  gabapentin 100 mg oral capsule: 1 cap(s) orally once a day  metoprolol tartrate 25 mg oral tablet: 1 orally 2 times a day  naproxen 375 mg oral tablet: 1 tab(s) orally every 8 hours As needed Moderate Pain (4 - 6)

## 2023-05-22 NOTE — DISCHARGE NOTE NURSING/CASE MANAGEMENT/SOCIAL WORK - NSDCPEFALRISK_GEN_ALL_CORE
For information on Fall & Injury Prevention, visit: [email or web address] OR  [email or web address] OR  https://www.cdc.gov/steadi/patient.html

## 2023-05-22 NOTE — PROGRESS NOTE ADULT - PROVIDER SPECIALTY LIST ADULT
Hospitalist
Vascular Surgery
Hospitalist
Vascular Surgery
Hospitalist
Hospitalist

## 2023-05-22 NOTE — DISCHARGE NOTE PROVIDER - NSDCFUADDINST_GEN_ALL_CORE_FT
FOLLOW UP: Dr. Gabriel in 1-2 weeks. Your appointment has been made for _______. Call the office at  with any questions.    WOUND CARE: You may shower; soap and water over incision sites. Do not scrub. Pat dry when done. Right and left bunion wounds: Clean with betadine, wrap with kerlix, do this once daily.    ACTIVITY: Ambulate as tolerated, but no heavy lifting (>10lbs) or strenuous exercise    Call the office if you experience increasing pain, redness, swelling or drainage from incision sites/wounds, or temperature >101.4F.     NEW MEDICATIONS:  Bactrim x7 days    ADDITIONAL FOLLOW UP AFTER DISCHARGE:   please follow up with your PCP per routine  Please follow up with orthopedist Dr. Dickerson 2 weeks after discharge to follow up on possible surgical intervention for your bunions. Call the office at [phone] to make an appointment.    DISCHARGE DESTINATION: home   FOLLOW UP: Dr. Gabriel in 1-2 weeks. Your appointment has been made for _______. Call the office at  with any questions.    WOUND CARE: You may shower; soap and water over incision sites. Do not scrub. Pat dry when done. Right and left bunion wounds: Clean with betadine, wrap with kerlix, do this once daily.    ACTIVITY: Ambulate as tolerated, but no heavy lifting (>10lbs) or strenuous exercise    Call the office if you experience increasing pain, redness, swelling or drainage from incision sites/wounds, or temperature >101.4F.     NEW MEDICATIONS:  Gabapentin 100mg qd    ADDITIONAL FOLLOW UP AFTER DISCHARGE:   please follow up with your PCP per routine  Please follow up with orthopedist Dr. Dickerson 2 weeks after discharge to follow up on possible surgical intervention for your bunions. Call the office at [phone] to make an appointment.    DISCHARGE DESTINATION: home   FOLLOW UP: Dr. Gabriel in 1 week. Your appointment has been made for May 31st at 1pm. Call the office at  with any questions.    WOUND CARE: You may shower; soap and water over incision sites. Do not scrub. Pat dry when done. Right and left bunion wounds: Clean with betadine, wrap with kerlix, do this once daily.    ACTIVITY: Ambulate as tolerated, but no heavy lifting (>10lbs) or strenuous exercise    Call the office if you experience increasing pain, redness, swelling or drainage from incision sites/wounds, or temperature >101.4F.     NEW MEDICATIONS:  Gabapentin 100mg qd    ADDITIONAL FOLLOW UP AFTER DISCHARGE:   please follow up with your PCP per routine  Please follow up with orthopedist Dr. Dickerson 1 week after discharge to follow up on possible surgical intervention for your bunions. Call the office at [phone] to make an appointment.    DISCHARGE DESTINATION: home

## 2023-05-22 NOTE — PROGRESS NOTE ADULT - SUBJECTIVE AND OBJECTIVE BOX
O/N: BENNIE, VSS. Vanc trough 11.8, received vanc 1750mg      Subjective: Pt seen and examined bedside, no complaints.    ROS:   Denies Headache, blurred vision, Chest Pain, SOB, Abdominal pain, nausea or vomiting     Social   heparin   Injectable 5000  metoprolol tartrate 25      Allergies    No Known Drug Allergies  unknown pain medication allergy (Other)    Intolerances        Vital Signs Last 24 Hrs  T(C): 36.8 (22 May 2023 05:48), Max: 36.8 (22 May 2023 05:48)  T(F): 98.2 (22 May 2023 05:48), Max: 98.2 (22 May 2023 05:48)  HR: 72 (22 May 2023 05:56) (70 - 76)  BP: 130/58 (22 May 2023 05:56) (125/63 - 134/69)  BP(mean): 84 (22 May 2023 05:56) (84 - 98)  RR: 18 (22 May 2023 05:56) (16 - 18)  SpO2: 97% (22 May 2023 05:56) (97% - 99%)    Parameters below as of 22 May 2023 05:56  Patient On (Oxygen Delivery Method): room air      I&O's Summary    20 May 2023 07:01  -  21 May 2023 07:00  --------------------------------------------------------  IN: 300 mL / OUT: 600 mL / NET: -300 mL    21 May 2023 07:01  -  22 May 2023 06:55  --------------------------------------------------------  IN: 1040 mL / OUT: 1450 mL / NET: -410 mL        Physical Exam:  General: Pt resting comfortably in bed, NAD  Pulmonary: no respiratory distress, on room air  Cardiovascular: regular rate and rhythm  Abdominal: soft, nontender, nondistended  Extremities: RLE: R bunion with healthy appearing base, decreased surrounding erythema, cleaned with betadine and redressed. LLE: bunion with healthy appearing base, no surrounding erythema, cleaned with betadine, and 2 dry pea-sized ulcers alogn lateral foot, both cleaned with betadine and L foot redressed.    LABS:                        12.3   7.36  )-----------( 233      ( 22 May 2023 03:10 )             37.4     05-22    138  |  106  |  20  ----------------------------<  88  4.4   |  23  |  0.84    Ca    9.2      22 May 2023 03:10  Phos  3.3     05-21  Mg     2.1     05-21        A/P: 81 yo f presented with hx of HTN and asthma p/w concern of right foot infection after a fall that resulted in an injury to her right foot. Failed PO antibiotics       Vascular/infx foot wound  -Vanc by level, next 5/23 3am  -MRI negative for osteo  -wound cx growing MRSA  -pain control  -continue gabapentin  -f/u am labs    HTN:   -continue home metoprolol     Asthma  -albuterol PRN    ID:   -vanc by level, next 5/23 3am  -wound cx growing MRSA  -blood cultures - NGTD    DVTppx: hsq    Activity: as tolerated    Diet: DASH    Dispo: continue IV abx

## 2023-05-22 NOTE — DISCHARGE NOTE PROVIDER - HOSPITAL COURSE
83 yo F, reported pmhx HTN, asthma and ADHD, presenting with concern for right foot infection. States that she has had bilateral bunions for the past several years but she sustained a fall on March 11th where she admits to LOC and injury to her right foot at the tip of the bunion. Since then it has been red and inflamed and painful, she reports that she can ambulate with a cane but it is painful and it is painful to drive. She reports that she had tried topical neosporin that only made the wound worse and then was seen by her podiatrist who prescribed her an oral antibiotic that she cannot recall for presumed treatment of cellulitis. She comes in today to see Dr. Gabriel on the recommendation of her niece who has had correspondence with Dr. Gabriel.     Pt was admitted to vascular surgery service for management of cellulitis with IV antibiotics and r/o osteomyelitis. Foot xray and MRI of right foot were negative for osteomyelitis, blood cultures were negative and wound cultures were positive for MRSA. Cellulitis improved with IV vanc. Pt was discharged on PO antibiotics x7 days.    Today patient is feeling well, all the VS and blood work is within normal limits, the pain is well controlled on oral pain medication, tolerating diet without nausea, and ambulating independently - patient is stable and ready for discharge today. 81 yo F, reported pmhx HTN, asthma and ADHD, presenting with concern for right foot infection. States that she has had bilateral bunions for the past several years but she sustained a fall on March 11th where she admits to LOC and injury to her right foot at the tip of the bunion. Since then it has been red and inflamed and painful, she reports that she can ambulate with a cane but it is painful and it is painful to drive. She reports that she had tried topical neosporin that only made the wound worse and then was seen by her podiatrist who prescribed her an oral antibiotic that she cannot recall for presumed treatment of cellulitis. She comes in today to see Dr. Gabriel on the recommendation of her niece who has had correspondence with Dr. Gabriel.     Pt was admitted to vascular surgery service for management of cellulitis with IV antibiotics and r/o osteomyelitis. Foot xray and MRI of right foot were negative for osteomyelitis, blood cultures were negative and wound cultures were positive for MRSA. Cellulitis improved with IV vanc, no WBC or fevers while inpatient.     Today patient is feeling well, all the VS and blood work is within normal limits, the pain is well controlled on oral pain medication, tolerating diet without nausea, and ambulating independently - patient is stable and ready for discharge today. 83 yo F, reported pmhx HTN, asthma and ADHD, presenting with concern for right foot infection. States that she has had bilateral bunions for the past several years but she sustained a fall on March 11th where she admits to LOC and injury to her right foot at the tip of the bunion. Since then it has been red and inflamed and painful, she reports that she can ambulate with a cane but it is painful and it is painful to drive. She reports that she had tried topical neosporin that only made the wound worse and then was seen by her podiatrist who prescribed her an oral antibiotic that she cannot recall for presumed treatment of cellulitis. She comes in today to see Dr. Gabriel on the recommendation of her niece who has had correspondence with Dr. Gabriel.     Pt was admitted to vascular surgery service for management of cellulitis with IV antibiotics and r/o osteomyelitis. Foot xray and MRI of right foot were negative for osteomyelitis, blood cultures were negative and wound cultures were positive for MRSA. Cellulitis improved with IV vanc, no WBC or fevers while inpatient. Podiatry was following while inpatient and orthopedic outpatient follow up was set up for possible surgical intervention for bilateral hallux valgus (bunions).    Today patient is feeling well, all the VS and blood work is within normal limits, the pain is well controlled on oral pain medication, tolerating diet without nausea, and ambulating independently - patient is stable and ready for discharge today.

## 2023-05-22 NOTE — PROGRESS NOTE ADULT - REASON FOR ADMISSION
Ulcerated Bunion

## 2023-05-22 NOTE — DISCHARGE NOTE PROVIDER - NSDCCPCAREPLAN_GEN_ALL_CORE_FT
PRINCIPAL DISCHARGE DIAGNOSIS  Diagnosis: Ulcer of toe  Assessment and Plan of Treatment:       SECONDARY DISCHARGE DIAGNOSES  Diagnosis: Mild intermittent asthma  Assessment and Plan of Treatment:     Diagnosis: Essential hypertension  Assessment and Plan of Treatment:     Diagnosis: History of conization of cervix  Assessment and Plan of Treatment:      PRINCIPAL DISCHARGE DIAGNOSIS  Diagnosis: Ulcer of toe  Assessment and Plan of Treatment:       SECONDARY DISCHARGE DIAGNOSES  Diagnosis: Mild intermittent asthma  Assessment and Plan of Treatment:     Diagnosis: Essential hypertension  Assessment and Plan of Treatment:     Diagnosis: History of conization of cervix  Assessment and Plan of Treatment:     Diagnosis: Hallux valgus, bilateral  Assessment and Plan of Treatment:

## 2023-05-22 NOTE — DISCHARGE NOTE PROVIDER - CARE PROVIDER_API CALL
Ananda Gabriel)  Vascular Surgery  [address] [place] [address]  Phone: [phone]  Fax: [phone]  Follow Up Time: 1 week    Yrn Dickerson)  Orthopaedic Surgery  [address] [place] [address]  Phone: [phone]  Fax: [phone]  Follow Up Time: 2 weeks   Ananda Gabriel)  Vascular Surgery  [address] [place] [address]  Phone: [phone]  Fax: [phone]  Follow Up Time: 1 week    Yrn Dickerson)  Orthopaedic Surgery  [address] [place] [address]  Phone: [phone]  Fax: [phone]  Follow Up Time: 1 week

## 2023-05-22 NOTE — PROGRESS NOTE ADULT - ASSESSMENT
82-year-old female with a PMHx of HTN and asthma who presented with concern for right foot infection after failed PO antibiotics.          #Right Foot Infection      -further management as per vascular surgery       -xray and MRI without evidence of OM   -BCx (5/17): NGTD     -Wound Cx (5/18): MRSA    -s/p 5-day course of Vancomycin (5/17 - 5/22), defer ABx on discharge to vascular surgery (if needed, Linezolid would be only option based on resistance pattern from wound culture on 5/18)    #HTN      -continue with metoprolol tartrate 25mg BID        #Asthma     -continue with PRN Albuterol    #Burning Foot Pain   -continue with low dose gabapentin     DVT PPx: SQH    Dispo: home 
82-year-old female with a PMHx of HTN and asthma who presented with concern for right foot infection after failed PO antibiotics.          #Right Foot Infection      -further management as per vascular surgery       -xray and MRI without evidence of OM   -BCx (5/17): NGTD     -Wound Cx (5/18): MRSA    -currently on vancomycin     #HTN      -continue with metoprolol tartrate 25mg BID        #Asthma     -continue with PRN Albuterol     #Burning Foot Pain  -can trial low dose gabapentin     DVT PPx: SQH    Dispo: home, likely Monday 
82-year-old female with a PMHx of HTN and asthma who presented with concern for right foot infection after failed PO antibiotics.        #Concern for Right Foot Infection    -further management as per vascular surgery     -podiatry consulted, appreciate recommendations     -xray and MRI without evidence of OM,   -BCx (5/17): NGTD   -continue with vancomycin as per primary team     #HTN    -continue with metoprolol tartrate 25mg BID      #Asthma   -continue with PRN Albuterol     DVT PPx: SQH    Dispo: home, pending Abx plan
82-year-old female with a PMHx of HTN and asthma who presented with concern for right foot infection after failed PO antibiotics.         #Right Foot Infection     -further management as per vascular surgery      -xray and MRI without evidence of OM  -BCx (5/17): NGTD    -Wound Cx (5/18): MRSA   -currently on vancomycin    #HTN     -continue with metoprolol tartrate 25mg BID       #Asthma    -continue with PRN Albuterol     DVT PPx: SQH     Dispo: home, likely Monday

## 2023-05-22 NOTE — DISCHARGE NOTE NURSING/CASE MANAGEMENT/SOCIAL WORK - PATIENT PORTAL LINK FT
You can access the FollowMyHealth Patient Portal offered by [facility] by registering at the following website: [email or web address]. By joining [facility] [employer]’s FollowMyHealth portal, you will also be able to view your health information using other applications (apps) compatible with our system.

## 2023-05-22 NOTE — DISCHARGE NOTE PROVIDER - PROVIDER TOKENS
PROVIDER:[TOKEN:[[id number]],FOLLOWUP:[1 week]],PROVIDER:[TOKEN:[[id number]],FOLLOWUP:[2 weeks]] PROVIDER:[TOKEN:[[id number]],FOLLOWUP:[1 week]],PROVIDER:[TOKEN:[[id number]],FOLLOWUP:[1 week]]

## 2023-05-22 NOTE — PROGRESS NOTE ADULT - SUBJECTIVE AND OBJECTIVE BOX
Subjective:  No acute events overnight.  Patient is doing well today without new complaints.  Sharp pain in feet is much improved today.  Denies HA, CP, SOB, abdominal pain, nausea, vomiting, fever, chills or diarrhea.     Objective:   Vital Signs:  T(C): 36.4 (22 May 2023 10:22), Max: 36.8 (22 May 2023 05:48)  T(F): 97.6 (22 May 2023 10:22), Max: 98.2 (22 May 2023 05:48)  HR: 80 (22 May 2023 08:38) (70 - 80)  BP: 126/60 (22 May 2023 08:38) (125/63 - 133/47)  BP(mean): 86 (22 May 2023 08:38) (84 - 98)  RR: 18 (22 May 2023 08:38) (16 - 18)  SpO2: 94% (22 May 2023 08:38) (94% - 99%)    Parameters below as of 22 May 2023 08:38  Patient On (Oxygen Delivery Method): room air    Physical Exam:  -Gen: NAD, resting in bed  -HEENT: EOMI, PERRL, no scleral icterus, no JVD, no bruits  -CV: normal S1 and S2  -Lungs: CTABL, normal respiratory effort on RA  -Ab: soft, NT, ND, normal BS  -Ext: no LE edema, right foot wrapped   -Neuro: A&O x 3, no focal deficits     Labs:                        12.3   7.36  )-----------( 233      ( 22 May 2023 03:10 )             37.4       05-22    138  |  106  |  20  ----------------------------<  88  4.4   |  23  |  0.84    Ca    9.2      22 May 2023 03:10  Phos  3.3     05-21  Mg     2.1     05-21    Microbiology:     Culture - Blood (collected 05-17-23 @ 20:35)  Source: .Blood Blood-Peripheral  Preliminary Report (05-21-23 @ 22:01):    No growth at 4 days.    Culture - Blood (collected 05-17-23 @ 20:20)  Source: .Blood Blood-Peripheral  Preliminary Report (05-21-23 @ 22:01):    No growth at 4 days.    Medications:  MEDICATIONS  (STANDING):  gabapentin 100 milliGRAM(s) Oral daily  heparin   Injectable 5000 Unit(s) SubCutaneous every 8 hours  metoprolol tartrate 25 milliGRAM(s) Oral two times a day    MEDICATIONS  (PRN):  albuterol    90 MICROgram(s) HFA Inhaler 2 Puff(s) Inhalation every 6 hours PRN Shortness of Breath and/or Wheezing  naproxen 375 milliGRAM(s) Oral every 8 hours PRN Moderate Pain (4 - 6)

## 2023-05-23 PROBLEM — Z00.00 ENCOUNTER FOR PREVENTIVE HEALTH EXAMINATION: Status: ACTIVE | Noted: 2023-05-23

## 2023-05-26 DIAGNOSIS — L97.511 NON-PRESSURE CHRONIC ULCER OF OTHER PART OF RIGHT FOOT LIMITED TO BREAKDOWN OF SKIN: ICD-10-CM

## 2023-05-26 DIAGNOSIS — J45.20 MILD INTERMITTENT ASTHMA, UNCOMPLICATED: ICD-10-CM

## 2023-05-26 DIAGNOSIS — B95.62 METHICILLIN RESISTANT STAPHYLOCOCCUS AUREUS INFECTION AS THE CAUSE OF DISEASES CLASSIFIED ELSEWHERE: ICD-10-CM

## 2023-05-26 DIAGNOSIS — Z72.89 OTHER PROBLEMS RELATED TO LIFESTYLE: ICD-10-CM

## 2023-05-26 DIAGNOSIS — M20.12 HALLUX VALGUS (ACQUIRED), LEFT FOOT: ICD-10-CM

## 2023-05-26 DIAGNOSIS — M20.11 HALLUX VALGUS (ACQUIRED), RIGHT FOOT: ICD-10-CM

## 2023-05-26 DIAGNOSIS — F90.9 ATTENTION-DEFICIT HYPERACTIVITY DISORDER, UNSPECIFIED TYPE: ICD-10-CM

## 2023-05-26 DIAGNOSIS — I10 ESSENTIAL (PRIMARY) HYPERTENSION: ICD-10-CM

## 2023-05-26 DIAGNOSIS — L03.031 CELLULITIS OF RIGHT TOE: ICD-10-CM

## 2023-05-26 DIAGNOSIS — Z91.81 HISTORY OF FALLING: ICD-10-CM

## 2023-05-26 DIAGNOSIS — M79.2 NEURALGIA AND NEURITIS, UNSPECIFIED: ICD-10-CM

## 2023-05-30 NOTE — ADDENDUM
[FreeTextEntry1] : I, Dr. Annada Gabriel, personally performed the evaluation and management (E/M) services for this new patient.  That E/M includes conducting the initial examination, assessing all conditions, and establishing the plan of care.  Today, my ACP, Ruth Pearson NP, was here to observe my evaluation and management services for this patient to be followed going forward.

## 2023-05-30 NOTE — HISTORY OF PRESENT ILLNESS
[FreeTextEntry1] : 83 yo F who was last seen during recent hospitalization at [facility]. She has a PMHx of HTN, asthma and ADHD. She was admitted 5/17 until 5/24 with right foot infection. She has had bilateral bunions for the past several years and sustained a fall on March 11th where she admits to LOC and injury to her right foot at the tip of the bunion. Since then it had been red and inflamed and painful. She reports that she can ambulate with a cane but it is painful and it is painful to drive. She reports that she had tried topical neosporin that only made the wound worse and then was seen by her podiatrist who prescribed her an oral antibiotic that she cannot recall for presumed treatment of cellulitis. Pt was admitted to vascular surgery service for management of cellulitis with IV antibiotics and r/o osteomyelitis. Foot xray and MRI of right foot were negative for osteomyelitis, blood cultures were negative and wound cultures were positive for MRSA. Cellulitis improved with IV vanc, no WBC or fevers while inpatient. Podiatry was following while inpatient and orthopedic outpatient follow up was set up for possible surgical intervention for bilateral hallux valgus (bunions). She was discharged on Bactrim DS.\par \par Today, she reports feeling well.

## 2023-05-30 NOTE — ASSESSMENT
[FreeTextEntry1] : 83 y/o F w/ was recently admitted to [facility] 5/17-5/24 with R foot infected  (+MRSA) ulcer over bunion after incidental fall at home. \par On exam,  [Arterial/Venous Disease] : arterial/venous disease

## 2023-05-30 NOTE — PHYSICAL EXAM
[Respiratory Effort] : normal respiratory effort [Alert] : alert [Oriented to Person] : oriented to person [Oriented to Place] : oriented to place [Oriented to Time] : oriented to time [Calm] : calm [de-identified] : WN/WD [de-identified] : FROM [de-identified] : See cardiovascular section

## 2023-05-31 ENCOUNTER — APPOINTMENT (OUTPATIENT)
Dept: VASCULAR SURGERY | Facility: CLINIC | Age: 83
End: 2023-05-31

## 2023-06-08 ENCOUNTER — TRANSCRIPTION ENCOUNTER (OUTPATIENT)
Age: 83
End: 2023-06-08

## 2023-06-08 RX ORDER — BUPIVACAINE AND MELOXICAM 400; 12 MG/14ML; MG/14ML
7 SOLUTION INFILTRATION ONCE
Refills: 0 | Status: DISCONTINUED | OUTPATIENT
Start: 2023-06-09 | End: 2023-06-09

## 2023-06-08 NOTE — ASU PATIENT PROFILE, ADULT - NSICDXPASTSURGICALHX_GEN_ALL_CORE_FT
PAST SURGICAL HISTORY:  History of conization of cervix      PAST SURGICAL HISTORY:  Cataract, bilateral     History of conization of cervix

## 2023-06-08 NOTE — ASU PATIENT PROFILE, ADULT - FALL HARM RISK - RISK INTERVENTIONS
Communicate Fall Risk and Risk Factors to all staff, patient, and family/Reinforce activity limits and safety measures with patient and family/Visual Cue: Yellow wristband/Bed in lowest position, wheels locked, appropriate side rails in place/Call bell, personal items and telephone in reach/Instruct patient to call for assistance before getting out of bed or chair/Non-slip footwear when patient is out of bed/[place] to call system/Physically safe environment - no spills, clutter or unnecessary equipment/Purposeful Proactive Rounding/Room/bathroom lighting operational, light cord in reach Assistance OOB with selected safe patient handling equipment/Assistance with ambulation/Communicate Fall Risk and Risk Factors to all staff, patient, and family/Discuss with provider need for PT consult/Monitor gait and stability/Provide patient with walking aids - walker, cane, crutches/Reinforce activity limits and safety measures with patient and family/Visual Cue: Yellow wristband/Bed in lowest position, wheels locked, appropriate side rails in place/Call bell, personal items and telephone in reach/Instruct patient to call for assistance before getting out of bed or chair/Non-slip footwear when patient is out of bed/[place] to call system/Physically safe environment - no spills, clutter or unnecessary equipment/Purposeful Proactive Rounding/Room/bathroom lighting operational, light cord in reach

## 2023-06-08 NOTE — ASU PATIENT PROFILE, ADULT - NS PREOP UNDERSTANDS INFO
No solid food/dairy/candy/gum after 21:30pm tonight, water allowed before 04:30am tomorrow. Patient reminded to come with photo ID/insurance card, dress comfortable/no jewelries/contact lens/valuables, no smoking/alcohol/recreational drug use; escort to come with photo ID, address and callback number was given./yes

## 2023-06-09 ENCOUNTER — TRANSCRIPTION ENCOUNTER (OUTPATIENT)
Age: 83
End: 2023-06-09

## 2023-06-09 ENCOUNTER — OUTPATIENT (OUTPATIENT)
Dept: OUTPATIENT SERVICES | Facility: HOSPITAL | Age: 83
LOS: 1 days | Discharge: HOME | End: 2023-06-09

## 2023-06-09 VITALS
HEART RATE: 87 BPM | OXYGEN SATURATION: 98 % | DIASTOLIC BLOOD PRESSURE: 71 MMHG | RESPIRATION RATE: 16 BRPM | TEMPERATURE: 99 F | SYSTOLIC BLOOD PRESSURE: 155 MMHG

## 2023-06-09 VITALS
RESPIRATION RATE: 15 BRPM | SYSTOLIC BLOOD PRESSURE: 178 MMHG | DIASTOLIC BLOOD PRESSURE: 75 MMHG | WEIGHT: 185.41 LBS | TEMPERATURE: 98 F | OXYGEN SATURATION: 98 % | HEART RATE: 89 BPM | HEIGHT: 64.5 IN

## 2023-06-09 DIAGNOSIS — Z98.890 OTHER SPECIFIED POSTPROCEDURAL STATES: Chronic | ICD-10-CM

## 2023-06-09 DIAGNOSIS — H26.9 UNSPECIFIED CATARACT: Chronic | ICD-10-CM

## 2023-06-09 DEVICE — IMPLANTABLE DEVICE: Type: IMPLANTABLE DEVICE | Site: RIGHT | Status: FUNCTIONAL

## 2023-06-09 DEVICE — K-WIRE MICROAIRE (SMOOTH) DOUBLE TROCAR 1.1MM X 4": Type: IMPLANTABLE DEVICE | Site: RIGHT | Status: FUNCTIONAL

## 2023-06-09 DEVICE — K-WIRE BRASSELER (EXTRA SHARP) DOUBLE DIAMOND 1.6MM X 4": Type: IMPLANTABLE DEVICE | Site: RIGHT | Status: FUNCTIONAL

## 2023-06-09 RX ORDER — ACETAMINOPHEN 500 MG
1000 TABLET ORAL ONCE
Refills: 0 | Status: DISCONTINUED | OUTPATIENT
Start: 2023-06-09 | End: 2023-06-09

## 2023-06-09 RX ORDER — METOPROLOL TARTRATE 50 MG
1 TABLET ORAL
Refills: 0 | DISCHARGE

## 2023-06-09 RX ORDER — SODIUM CHLORIDE 9 MG/ML
500 INJECTION, SOLUTION INTRAVENOUS
Refills: 0 | Status: DISCONTINUED | OUTPATIENT
Start: 2023-06-09 | End: 2023-06-09

## 2023-06-09 RX ORDER — ONDANSETRON 8 MG/1
4 TABLET, FILM COATED ORAL ONCE
Refills: 0 | Status: DISCONTINUED | OUTPATIENT
Start: 2023-06-09 | End: 2023-06-09

## 2023-06-09 RX ORDER — CHLORHEXIDINE GLUCONATE 213 G/1000ML
1 SOLUTION TOPICAL ONCE
Refills: 0 | Status: COMPLETED | OUTPATIENT
Start: 2023-06-09 | End: 2023-06-09

## 2023-06-09 RX ORDER — HYDROMORPHONE HYDROCHLORIDE 2 MG/ML
0.25 INJECTION INTRAMUSCULAR; INTRAVENOUS; SUBCUTANEOUS
Refills: 0 | Status: DISCONTINUED | OUTPATIENT
Start: 2023-06-09 | End: 2023-06-09

## 2023-06-09 NOTE — ASU DISCHARGE PLAN (ADULT/PEDIATRIC) - PROCEDURE
R forefoot reconstruction, 1st met chevron osteotomy, 1st MTP modified [place], 2,3 PIP arthroplasty

## 2023-06-09 NOTE — ASU DISCHARGE PLAN (ADULT/PEDIATRIC) - NS MD DC FALL RISK RISK
For information on Fall & Injury Prevention, visit: [email or web address] OR  [email or web address] OR  https://www.cdc.gov/steadi/patient.html

## 2023-06-09 NOTE — BRIEF OPERATIVE NOTE - PRIMARY SURGEON
Jayla Hemigard Intro: Due to skin fragility and wound tension, it was decided to use HEMIGARD adhesive retention suture devices to permit a linear closure. The skin was cleaned and dried for a 6cm distance away from the wound. Excessive hair, if present, was removed to allow for adhesion.

## 2023-06-09 NOTE — ASU DISCHARGE PLAN (ADULT/PEDIATRIC) - ASU DC SPECIAL INSTRUCTIONSFT
Weight Bearing Status: Heel touch weight bearing on RLE with walker in hard sole shoe until she follows up with Dr Dickerson next week     Prescriptions sent from Dr Dickerson office to pt pharmacy

## 2023-06-09 NOTE — ASU PREOP CHECKLIST - RESPIRATORY RATE (BREATHS/MIN)
15
Airway patent, TM normal bilaterally, normal appearing mouth, nose, throat, neck supple with full range of motion, no cervical adenopathy.

## 2023-06-09 NOTE — ASU DISCHARGE PLAN (ADULT/PEDIATRIC) - CARE PROVIDER_API CALL
Yrn Dickerson Brendan  Orthopaedic Surgery  [address] [place] [address]  Phone: [phone]  Fax: [phone]  Follow Up Time:

## (undated) DEVICE — BUR LINVATEC OVAL MEDIUM 4MM CARBIDE

## (undated) DEVICE — TOURNIQUET CUFF 34" DUAL PORT W PLC

## (undated) DEVICE — TOURNIQUET ESMARK 6"

## (undated) DEVICE — DRSG ACE BANDAGE 6"

## (undated) DEVICE — WARMING BLANKET UPPER ADULT

## (undated) DEVICE — DRAPE C ARM MINI PACK FOR 6800

## (undated) DEVICE — SAW BLADE LINVATEC SAGITTAL MIC 9.5X25.5X0.4MM

## (undated) DEVICE — SUT MONOCRYL 4-0 18" PS-2

## (undated) DEVICE — Device

## (undated) DEVICE — SUT VICRYL 2-0 27" CT-2 UNDYED

## (undated) DEVICE — MARKING PEN W RULER

## (undated) DEVICE — PACK ORTHO FOOT ANKLE

## (undated) DEVICE — SLV COMPRESSION KNEE MED

## (undated) DEVICE — SUT MONOCRYL PLUS 4-0 18" PS-2 UNDYED

## (undated) DEVICE — SUT CHROMIC 4-0 18" PS-2

## (undated) DEVICE — DRSG WEBRIL 6"

## (undated) DEVICE — MICROAIRE PIN CAP 0.045" WHITE

## (undated) DEVICE — GLV 8 PROTEXIS (WHITE)

## (undated) DEVICE — DRSG WEBRIL 4"